# Patient Record
Sex: FEMALE | Race: WHITE | Employment: FULL TIME | URBAN - METROPOLITAN AREA
[De-identification: names, ages, dates, MRNs, and addresses within clinical notes are randomized per-mention and may not be internally consistent; named-entity substitution may affect disease eponyms.]

---

## 2022-10-20 ENCOUNTER — APPOINTMENT (OUTPATIENT)
Dept: GENERAL RADIOLOGY | Age: 52
End: 2022-10-20
Payer: COMMERCIAL

## 2022-10-20 ENCOUNTER — HOSPITAL ENCOUNTER (EMERGENCY)
Age: 52
Discharge: HOME | End: 2022-10-20
Payer: COMMERCIAL

## 2022-10-20 VITALS
BODY MASS INDEX: 29.53 KG/M2 | RESPIRATION RATE: 20 BRPM | SYSTOLIC BLOOD PRESSURE: 144 MMHG | HEIGHT: 72 IN | WEIGHT: 218 LBS | TEMPERATURE: 97.2 F | HEART RATE: 65 BPM | DIASTOLIC BLOOD PRESSURE: 82 MMHG | OXYGEN SATURATION: 99 %

## 2022-10-20 DIAGNOSIS — S46.912A STRAIN OF LEFT SHOULDER, INITIAL ENCOUNTER: Primary | ICD-10-CM

## 2022-10-20 PROCEDURE — 73030 X-RAY EXAM OF SHOULDER: CPT

## 2022-10-20 PROCEDURE — 99283 EMERGENCY DEPT VISIT LOW MDM: CPT

## 2022-10-20 PROCEDURE — 73060 X-RAY EXAM OF HUMERUS: CPT

## 2022-10-20 RX ORDER — NAPROXEN 500 MG/1
500 TABLET ORAL 2 TIMES DAILY
Qty: 60 TABLET | Refills: 0 | Status: SHIPPED | OUTPATIENT
Start: 2022-10-20 | End: 2022-10-27

## 2022-10-20 RX ORDER — CYCLOBENZAPRINE HCL 10 MG
10 TABLET ORAL 3 TIMES DAILY PRN
Qty: 21 TABLET | Refills: 0 | Status: SHIPPED | OUTPATIENT
Start: 2022-10-20 | End: 2022-10-27

## 2022-10-20 ASSESSMENT — PAIN DESCRIPTION - PAIN TYPE: TYPE: ACUTE PAIN

## 2022-10-20 ASSESSMENT — ENCOUNTER SYMPTOMS
SORE THROAT: 0
RHINORRHEA: 0
COLOR CHANGE: 0
CONSTIPATION: 0
ABDOMINAL DISTENTION: 0
EYE DISCHARGE: 0
SHORTNESS OF BREATH: 0
ABDOMINAL PAIN: 0

## 2022-10-20 ASSESSMENT — PAIN DESCRIPTION - DESCRIPTORS: DESCRIPTORS: BURNING

## 2022-10-20 ASSESSMENT — PAIN DESCRIPTION - LOCATION: LOCATION: SHOULDER

## 2022-10-20 ASSESSMENT — PAIN - FUNCTIONAL ASSESSMENT: PAIN_FUNCTIONAL_ASSESSMENT: 0-10

## 2022-10-20 ASSESSMENT — PAIN DESCRIPTION - ORIENTATION: ORIENTATION: LEFT

## 2022-10-20 ASSESSMENT — PAIN DESCRIPTION - FREQUENCY: FREQUENCY: CONTINUOUS

## 2022-10-20 ASSESSMENT — PAIN SCALES - GENERAL: PAINLEVEL_OUTOF10: 8

## 2022-10-20 NOTE — ED PROVIDER NOTES
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 Loreto Ye is a 47 y.o. female who presents to the emergency department complaint of left shoulder, left arm pain, which patient states started yesterday after fall at work. States that she tripped, landing with outstretched arm on a CNC machine at work, she states since that time increasing pain with movement or elevation of left arm across anterior chest wall. She rates her current pain at this time is 8 out of 10. She denies any head neck or back pain. Denies any other injury. No past medical history per patient or chart review. HPI    NursingNotes were reviewed. REVIEW OF SYSTEMS    (2-9 systems for level 4, 10 or more for level 5)     Review of Systems   Constitutional:  Negative for activity change and appetite change. HENT:  Negative for congestion, ear discharge, ear pain, nosebleeds, rhinorrhea and sore throat. Eyes:  Negative for discharge. Respiratory:  Negative for shortness of breath. Cardiovascular:  Negative for chest pain, palpitations and leg swelling. Gastrointestinal:  Negative for abdominal distention, abdominal pain and constipation. Genitourinary:  Negative for difficulty urinating and dysuria. Musculoskeletal:  Negative for arthralgias. Left shoulder and chest wall pain   Skin:  Negative for color change. Neurological:  Negative for dizziness, syncope, numbness and headaches.    Psychiatric/Behavioral:  Negative for agitation and confusion. Except as noted above the remainder of the review of systems was reviewed and negative. PAST MEDICAL HISTORY   History reviewed. No pertinent past medical history. SURGICALHISTORY     History reviewed. No pertinent surgical history. CURRENT MEDICATIONS       Previous Medications    No medications on file       ALLERGIES     Norco [hydrocodone-acetaminophen]    FAMILY HISTORY     History reviewed. No pertinent family history. SOCIAL HISTORY       Social History     Socioeconomic History    Marital status: Legally      Spouse name: None    Number of children: None    Years of education: None    Highest education level: None       SCREENINGS    Sameer Coma Scale  Eye Opening: Spontaneous  Best Verbal Response: Oriented  Best Motor Response: Obeys commands  Sameer Coma Scale Score: 15 @FLOW([id number])@      PHYSICAL EXAM    (up to 7 for level 4, 8 or more for level 5)     ED Triage Vitals [10/20/22 0824]   BP Temp Temp Source Heart Rate Resp SpO2 Height Weight   (!) 144/80 97.1 °F (36.2 °C) Temporal 75 20 100 % 6' (1.829 m) 218 lb (98.9 kg)       Physical Exam  Vitals and nursing note reviewed. Constitutional:       General: She is not in acute distress. Appearance: She is well-developed. She is not ill-appearing, toxic-appearing or diaphoretic. HENT:      Head: Normocephalic. Nose: Nose normal. No congestion. Mouth/Throat:      Mouth: Mucous membranes are moist.      Pharynx: No oropharyngeal exudate or posterior oropharyngeal erythema. Eyes:      Extraocular Movements: Extraocular movements intact. Conjunctiva/sclera: Conjunctivae normal.      Pupils: Pupils are equal, round, and reactive to light. Neck:      Vascular: No JVD. Trachea: No tracheal deviation. Cardiovascular:      Rate and Rhythm: Normal rate. Pulses: Normal pulses. Heart sounds: Normal heart sounds. No murmur heard. No friction rub. No gallop. Pulmonary:      Effort: Pulmonary effort is normal. No tachypnea, accessory muscle usage, respiratory distress or retractions. Breath sounds: Normal breath sounds. No stridor. No wheezing, rhonchi or rales. Comments: Lung sounds are clear in all fields, there is no wheezes rales or rhonchi, no excess muscle use, tractions, room air saturations are 100%  Chest:      Chest wall: No tenderness. Abdominal:      General: Abdomen is flat. Bowel sounds are normal. There is no distension or abdominal bruit. Palpations: There is no shifting dullness, fluid wave, hepatomegaly, splenomegaly, mass or pulsatile mass. Tenderness: There is no abdominal tenderness. There is no right CVA tenderness, left CVA tenderness, guarding or rebound. Negative signs include Bernardo's sign, Rovsing's sign and McBurney's sign. Musculoskeletal:         General: No deformity. Cervical back: Normal range of motion and neck supple. No rigidity. Comments: Patient is pain across anterior left upper chest wall, as well as anterior shoulder, and proximal humerus. There is no crepitus or instability, patient minimally able to raise arm off the bed to approximately 22 degrees. She has abduction, with minimal increase in pain, positive sensation, skin is pink warm and dry, capillary refill within 3 seconds distally, positive radial pulses. Patient moves elbow wrist hand and fingers well without any increase in pain. Skin:     General: Skin is warm and dry. Capillary Refill: Capillary refill takes less than 2 seconds. Coloration: Skin is not jaundiced. Neurological:      General: No focal deficit present. Mental Status: She is alert and oriented to person, place, and time. Mental status is at baseline. Cranial Nerves: No cranial nerve deficit. Sensory: No sensory deficit. Motor: No weakness.       Coordination: Coordination normal.   Psychiatric:         Mood and Affect: Mood normal. DIAGNOSTIC RESULTS     EKG: All EKG's are interpreted by the Emergency Department Physician who either signs or Co-signsthis chart in the absence of a cardiologist.        RADIOLOGY:   Buddie Graft such as CT, Ultrasound and MRI are read by the radiologist. Jonathon Georges radiographic images are visualized and preliminarily interpreted by the emergency physician with the below findings:    X-ray of the left shoulder shows no acute fracture or subluxation. Interpretation per the Radiologist below, if available at the time ofthis note:    XR SHOULDER LEFT (MIN 2 VIEWS)    (Results Pending)   XR HUMERUS LEFT (MIN 2 VIEWS)    (Results Pending)         ED BEDSIDE ULTRASOUND:   Performed by ED Physician - none    LABS:  Labs Reviewed - No data to display    All other labs were within normal range or not returned as of this dictation. EMERGENCY DEPARTMENT COURSE and DIFFERENTIAL DIAGNOSIS/MDM:   Vitals:    Vitals:    10/20/22 0824   BP: (!) 144/80   Pulse: 75   Resp: 20   Temp: 97.1 °F (36.2 °C)   TempSrc: Temporal   SpO2: 100%   Weight: 218 lb (98.9 kg)   Height: 6' (1.829 m)            MDM  Number of Diagnoses or Management Options  Strain of left shoulder, initial encounter  Diagnosis management comments: Patient complains of left shoulder and chest wall pain secondary to injury which occurred yesterday while patient was at work, she states she fell landing on her left arm, straining chest wall, and left shoulder. Patient has increased pain with movement, elevation of left upper extremity. X-ray was obtained of the shoulder which shows no acute bony fractures, there is concerns for rotator cuff injury, versus that of shoulder strain. Patient was given a prescription for Naprosyn Flexeril, and advised to contact occupational medicine for follow-up.   She was placed on work restrictions as I feel that any use of her left upper extremities at this time will cause worsening pain, and potential for further injury. She was advised to contact occupational medicine for follow-up, should she have any worsening or change in condition, she was advised return to the ED. CRITICAL CARE TIME   Total Critical Care time was  minutes, excluding separately reportableprocedures. There was a high probability of clinicallysignificant/life threatening deterioration in the patient's condition which required my urgent intervention. CONSULTS:  None    PROCEDURES:  Unless otherwise noted below, none     Procedures    FINAL IMPRESSION      1.  Strain of left shoulder, initial encounter          DISPOSITION/PLAN   DISPOSITION Decision To Discharge 10/20/2022 09:34:07 AM      PATIENT REFERRED TO:    [facility]  In 1 day      DISCHARGE MEDICATIONS:  New Prescriptions    CYCLOBENZAPRINE (FLEXERIL) 10 MG TABLET    Take 1 tablet by mouth 3 times daily as needed for Muscle spasms    NAPROXEN (NAPROSYN) 500 MG TABLET    Take 1 tablet by mouth 2 times daily for 7 days          (Please note that portions of this note were completed with a voice recognition program.  Efforts were made to edit the dictations but occasionally words are mis-transcribed.)    Lencho Ayala PA-C (electronically signed)  Attending Emergency Physician         Lenhco Ayala PA-C  10/20/22 5518

## 2022-10-20 NOTE — ED TRIAGE NOTES
Pt presents to ED with c/o L shoulder pain. 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 Since, pt reports \"burning\" to L shoulder, limited ROM, and swelling to LUE. Sensations intact. Upon assessment, pt is A/Ox4, skin p/w/d, respirations even and unlabored, msp's intact. Pt denies chest pain, SOB, n/v/d, fever, and chills. Denies hitting head during incident. L radial pulse palp, cap refill < 2 seconds.

## 2022-11-08 ENCOUNTER — HOSPITAL ENCOUNTER (OUTPATIENT)
Dept: MRI IMAGING | Age: 52
Discharge: HOME | End: 2022-11-10

## 2022-11-08 DIAGNOSIS — S46.912A STRAIN OF ELBOW, LEFT, INITIAL ENCOUNTER: ICD-10-CM

## 2022-11-08 PROCEDURE — 73221 MRI JOINT UPR EXTREM W/O DYE: CPT

## 2022-12-02 ENCOUNTER — HOSPITAL ENCOUNTER (OUTPATIENT)
Dept: PHYSICAL THERAPY | Age: 52
Setting detail: THERAPIES SERIES
Discharge: HOME | End: 2022-12-02
Payer: COMMERCIAL

## 2022-12-02 PROCEDURE — 97161 PT EVAL LOW COMPLEX 20 MIN: CPT

## 2022-12-02 PROCEDURE — 97110 THERAPEUTIC EXERCISES: CPT

## 2022-12-02 ASSESSMENT — PAIN DESCRIPTION - FREQUENCY: FREQUENCY: INTERMITTENT

## 2022-12-02 ASSESSMENT — PAIN DESCRIPTION - ONSET: ONSET: SUDDEN

## 2022-12-02 ASSESSMENT — PAIN - FUNCTIONAL ASSESSMENT: PAIN_FUNCTIONAL_ASSESSMENT: PREVENTS OR INTERFERES WITH ALL ACTIVE AND SOME PASSIVE ACTIVITIES

## 2022-12-02 ASSESSMENT — PAIN DESCRIPTION - PAIN TYPE: TYPE: CHRONIC PAIN

## 2022-12-02 ASSESSMENT — PAIN DESCRIPTION - ORIENTATION: ORIENTATION: LEFT

## 2022-12-02 ASSESSMENT — PAIN SCALES - GENERAL: PAINLEVEL_OUTOF10: 0

## 2022-12-02 ASSESSMENT — PAIN DESCRIPTION - DESCRIPTORS: DESCRIPTORS: SHARP;STABBING

## 2022-12-02 ASSESSMENT — PAIN DESCRIPTION - LOCATION: LOCATION: SHOULDER

## 2022-12-02 NOTE — PROGRESS NOTES
[address]  PHYSICAL THERAPY EVALUATION      Physical Therapy: Initial Evaluation    Patient: Elissa Patricia (43 y.o.     female)   Examination Date: 2022   :  1970 ;    Confirmed: Yes MRN: [id number]  CSN: [id number]   Insurance: Payor: [employer] / Plan: [employer] / Product Type: *No Product type* /   Insurance ID: [id number] - (Worker's Comp) Secondary Insurance (if applicable):    Referring Physician: Teo Nugent MD       Visits to Date/Visits Approved:     No Show/Cancelled Appts: 0 / 0     Medical Diagnosis: Strain of unspecified muscle, fascia and tendon at shoulder and upper arm level, left arm, initial encounter [S46.912A]  Strain of muscle, fascia and tendon at neck level, initial encounter [S16. 1XXA]        Treatment Diagnosis: L shoulder and neck pain,decreased L shoulder AROM, decreased cervical AROM, decreased thoracic rotation AROM, decreased L UE strength, decreased posture, decreased activity tolerance, and signifciant soft tissue reestrictions throughout cervical and shoulder complex     PERTINENT MEDICAL HISTORY   Patient Assessed for Rehabilitation Services: Yes       Medical History: Chart Reviewed: Yes No past medical history on file. Surgical History: No past surgical history on file. Medications:   Current Outpatient Medications:     naproxen (NAPROSYN) 500 MG tablet, Take 1 tablet by mouth 2 times daily for 7 days, Disp: 60 tablet, Rfl: 0  Allergies: Norco [hydrocodone-acetaminophen]      Imaging (if applicable): MRI SHOULDER LEFT WO CONTRAST    Result Date: 2022  EXAMINATION: MRI OF THE LEFT SHOULDER WITHOUT CONTRAST   2022 2:01 pm TECHNIQUE: Multiplanar multisequence MRI of the left shoulder was performed without the administration of intravenous contrast. COMPARISON: Radiographs 10/20/2022.  HISTORY: ORDERING SYSTEM PROVIDED HISTORY: Strain of elbow, left, initial encounter TECHNOLOGIST PROVIDED HISTORY: What reading provider will be dictating this exam?->CRC Fall at work with left shoulder pain. FINDINGS: ROTATOR CUFF: Moderate tendinosis involving supraspinatus with mostly low-grade partial-thickness articular sided tearing of the distal fibers measuring around 2.0 cm AP. Mild tendinosis involving infraspinatus and subscapularis, without tear. Teres minor appears intact. Reactive cystic changes at the infraspinatus insertions. BICEPS TENDON: Appears intact with mild intra-articular tendinosis. LABRUM: Areas of fraying/tearing especially superiorly GLENOHUMERAL JOINT: No evidence for full-thickness chondral defect. Small joint effusion. AC JOINT: Mild degenerative changes. Small amount of fluid in the subacromial subdeltoid bursa region. BONE MARROW: No evidence of fracture. Normal marrow signal.     Rotator cuff tendinosis with mostly low-grade partial-thickness articular sided tearing involving distal supraspinatus, without full-thickness tear. 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 Used a stool to reach inside of machine and stool came out from under her and she landed on L shoulder. Went to the ER the following day d/t pain. Saw occupational health who gave her an anti-inflammatory, injection, and mm relaxer. Pt denies having any N/T into UE currently though does occurr intermittently from posterior UE from elbow to 5th digit.   Additional Pertinent Hx (if applicable):     Prior diagnostic testing[de-identified] MRI, X-ray  Previous treatments prior to current episode?: Injections, Medications      Learning/Language: Learning  Does the patient/guardian have any barriers to learning?: No barriers  Will there be a co-learner?: No  What is the preferred language of the patient/guardian?: English  Is an  required?: No     Pain Screening    Pain Screening  Patient Currently in Pain: Yes  Pain Assessment: 0-10  Pain Level: 0  Best Pain Level: 0  Worst Pain Level: 10  Pain Type: Chronic pain  Pain Location: Shoulder  Pain Orientation: Left  Pain Descriptors: Sharp, Stabbing  Pain Frequency: Intermittent  Pain Onset: Sudden  Functional Pain Assessment: Prevents or interferes with all active and some passive activities  Aggravating factors: Movement, Reaching, Lifting, Carrying  Pain Management/Relieving Factors: Rest, Medications    Functional Status      Occupation/Interests:   Occupation: Full time employment  Type of Occupation: [employer]- Currently working with restrictions- 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, taking parts in and out, 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#  Job Duties: Prolonged standing, Heavy lifting, Moderate lifting, Repetitive or prolonged grasping  Leisure & Hobbies: playing darts    Prior Level of Function:   100% Independent      Current Level of Function:   25%      ADL Assistance: Independent  Homemaking Assistance: Independent  Homemaking Responsibilities: Yes  Ambulation Assistance: Independent  Transfer Assistance: Independent  Active : Yes    Dominant Hand: : Right    OBJECTIVE EXAMINATION     Review of Systems:  Follows Commands: Within Functional Limits    Observations:   General Observations  Description: or pose with rounded shoudlers, FHP, increased thoracic kyphosis, protracted scaps    Palpation:   Right Shoulder Palpation: mod tightness UT, periscapulars, LS  Left Shoulder Palpation: significant tightness with multiple TP's noted throughout UT, periscapulars, and LS; mod tightness pec, bicep, deltoid;  Tender to palpate RTC insertion    Left AROM  Right AROM         AROM LUE (degrees)  L Shoulder Flexion (0-180): 70 with pain  L Shoulder Extension (0-45): 20 with pain  L Shoulder ABduction (0-180): 62 with pain  L Shoulder Int Rotation  (0-70): Greater trochanter with pain, unable to reach midline  L Shoulder Ext Rotation  (0-90): T1    AROM RUE (degrees)  R Shoulder Flexion (0-180): 170  R Shoulder Extension (0-45): 70  R Shoulder ABduction (0-180): 168  R Shoulder Int Rotation  (0-70): T12  R Shoulder Ext Rotation (0-90): T3      Left PROM  Right PROM         PROM LUE (degrees)  L Shoulder Flex  (0-180): limited d/t pain  L Shoulder ABduction (0-180): limited d/t pain  L Shoulder Int Rotation  (0-70): limited d/t pain  L Shoulder Ext Rotation  (0-90): limited d/t pain NT         Left Strength  Right Strength        L UE Strength Comment: shoulder 2-/5, elbow flex 4/5 ext 4-/5  L Lower Trapezius: 4/5      R UE Strength Comment: 5/5 except shoulder abd 4+/5  R Middle Trapezius: 4/5  R Rhomboids: 4/5  R Lower Trapezius: 4-/5  R UE Strength Comment: 5/5 except shoulder abd 4+/5     Cervical Assessment     AROM Cervical Spine   Cervical spine general AROM: flex 65, ext 30, SB L 25 with pain R 35, Rot L 40 with pain R 73     Thoracic Assessment     AROM Thoracic Spine   Thoracic spine general AROM: B rotation 50%        Special Tests:   Special Tests: N/A (limited shoulder mobility with guarding, review recent MRI)    Outcomes Score:  Exam: UEFI 59/ 80, NDI 9/50    Treatment:    Exercises:   Exercises  Exercise 1: L UT stretch without over pressure 20\"x3  Exercise 2: L LS stretch without over pressure 20\"x3  Exercise 3: table slides flex, scaption 5\"x10  Exercise 4: pulleys*  Exercise 5: scap retract*  Exercise 6: wand ex*  Exercise 7: thoracic rotation*  Exercise 8: chin nods*  Exercise 9: R cervical rotation*  Exercise 10: finger ladder*  Exercise 14: PROM: L shoulder*  Exercise 20: HEP: LS, UT stretch, table slides  Treatment Reasoning  Limitations addressed:  Mobility, Flexibility    Modalities:MHP/CP/US/Estim/cervical traction*    Manual:  Manual Therapy  Soft Tissue Mobilizaton: L UT, LS, periscapulars, bicep, deltoid*     *Indicates exercise,modality, or manual techniques to be initiated when appropriate  ASSESSMENT     Impression: Assessment: Pt presents with onset of L shoulder and neck pain beginning after a work related injury where she slipped off a stool reaching into a machine and landed on her L UE 10/19/22. She currently presents with decreased L shoulder AROM, decreased cervical AROM, decreased thoracic rotation AROM, decreased L UE strength, decreased posture, decreased activity tolerance, and signifciant soft tissue reestrictions throughout cervical and shoulder complex. These impairments currently limit her fucntional abilities to reach, lift, carry, perform ADL's, household duties, work duties, push, pull, or sleep without limitations at PLOF. Body Structures, Functions, Activity Limitations Requiring Skilled Therapeutic Intervention: Decreased posture, Increased pain, Decreased ADL status, Decreased ROM, Decreased strength, Decreased tolerance to work activity    Statement of Medical Necessity: Physical Therapy is both indicated and medically necessary as outlined in the POC to increase the likelihood of meeting the functionally related goals stated below.      Patient's Activity Tolerance: Patient tolerated evaluation without incident Increased pain noted post exertion, declined modalities    Patient's rehabilitation potential/prognosis is considered to be: Good    Factors which may impact rehabilitation potential include: Chronicity of problem, Profession/work barriers     Patient Education: PT Education: Goals, PT Role, Plan of Care, Evaluative findings, Insurance, Home Exercise Program     GOALS   Patient Goal(s): Patient Goals : \"Use my shoulder\"    Short Term Goals Completed by 2-3 weeks Goal Status   STG 1The pt will demonstrate improved postural awareness requiring <25% VC's with exercises New   STG 2 The pt will have decreased L UE pain </= 3/10 at worst in order to increase ease with ADL's New     Long Term Goals Completed by 4-6 weeks Goal Status   LTG 1 The pt will be indep/compliant with HEP in order to self-manage symptoms upon D/C New   LTG 2 The pt will have an increase in UEFI score >/=70/80 and decrease in NDI score to 0/50 points in order to increase functional activity tolerance New   LTG 3 The pt will demonstrate improved L UE strength >/=4+/5 in order to lift/carry up to 50# for work related duties at [address] [place] [address]   LT 4 The pt will demo improved L shoulder and cervical AROM WNL's and B thoracic rotation >/=75% in order to increase ease with UE ADL's New     TREATMENT PLAN       Requires PT Follow-Up: Yes  Treatment Initiated : ther ex and HEP    Treatment may include any combination of the following: Strengthening, ROM, Neuromuscular re-education, Manual, Pain management, Return to work related activity, Home exercise program, Patient/Caregiver education & training, Modalities, Positioning, Dry needling     Frequency / Duration:  Patient to be seen 2 times per week for 4-6 weeks        Eval Complexity:   Decision Making: Medium Complexity  History: Personal Factors and/or Comorbidities Impacting POC: Medium  Examination of body system(s) including body structures and functions, activity limitations, and/or participation restrictions: High  Exam: UEFI 59/ 80, NDI 9/50  Clinical Presentation: Medium  Clinical Decision Making : Medium Complexity    POST-PAIN     Pain Rating (0-10 pain scale):   5/10  Location and pain description same as pre-treatment unless indicated. Action: [] NA  [] Call Physician  [x] Perform HEP  [] Meds as prescribed    Evaluation and patient rights have been reviewed and patient agrees with plan of care.   Yes  [x]  No  []   Explain:     Tamia Fall Risk Assessment  Risk Factor Scale  Score   History of Falls [] Yes  [x] No 25  0    Secondary Diagnosis [] Yes  [x] No 15  0    Ambulatory Aid [] Furniture  [] Crutches/cane/walker  [x] None/bedrest/wheelchair/nurse 30  15  0    IV/Heparin Lock [] Yes  [x] No 20  0    Gait/Transferring [] Impaired  [] Weak  [x] Normal/bedrest/immobile 20  10  0    Mental Status [] Forgets limitations  [x] Oriented to own ability 15  0       Total: 0     Based on the Assessment score: check the appropriate box.   [x]  No intervention needed   Low =   Score of 0-24  []  Use standard prevention interventions Moderate =  Score of 24-44   [] Discuss fall prevention strategies   [] Indicate moderate falls risk on eval  []  Use high risk prevention interventions High = Score of 45 and higher   [] Discuss fall prevention strategies   [] Provide supervision during treatment time    Minutes:  PT Individual Minutes  Time In: 1512  Time Out: C/ [place]arias 66  Minutes: 36  Timed Code Treatment Minutes: 12 Minutes  Procedure Minutes: 24 eval      Time In Time Out Total Time Units    PT Evaluation: Moderate Complexity ([id number]) 1719 4865 24 1   Ther ex ([id number]) 2221 8213 12 1     Electronically signed by Neeraj Velasquez, PT on 12/2/22 at 3:55 PM EST

## 2022-12-07 ENCOUNTER — HOSPITAL ENCOUNTER (OUTPATIENT)
Dept: PHYSICAL THERAPY | Age: 52
Setting detail: THERAPIES SERIES
Discharge: HOME | End: 2022-12-07

## 2022-12-07 NOTE — PROGRESS NOTES
Therapy                            Cancellation/No-show Note      Date: 2022  Patient: Hortensia Everett (75 y.o. female)  : 1970  MRN:  [id number]  Referring Physician: No ref. provider found    Medical Diagnosis: No admission diagnoses are documented for this encounter. Visit Information:  Visits to Date 1   No Show/Cancelled Appts: 0       For today's appointment patient:  [x]  Cancelled  []  Rescheduled appointment  []  No-show   []  Called pt to remind of next appointment     Reason given by patient:  [x]  Patient ill  []  Conflicting appointment  []  No transportation    []  Conflict with work  []  No reason given  []  Other:      [x] Pt has future appointments scheduled, no follow up needed  [] Pt requests to be on hold.     Reason:   If > 2 weeks please discuss with therapist.  [] Therapist to call pt for follow up     Comments:       Signature: Electronically signed by Georgiana Celaya PTA on 22 at 7:54 AM EST

## 2022-12-09 ENCOUNTER — HOSPITAL ENCOUNTER (OUTPATIENT)
Dept: PHYSICAL THERAPY | Age: 52
Setting detail: THERAPIES SERIES
Discharge: HOME | End: 2022-12-09
Payer: COMMERCIAL

## 2022-12-09 NOTE — PROGRESS NOTES
Therapy                            Cancellation/No-show Note      Date: 2022  Patient: Yong Dancer (37 y.o. female)  : 1970  MRN:  [id number]  Referring Physician: Ginny Mckenzie MD    Medical Diagnosis: Strain of unspecified muscle, fascia and tendon at shoulder and upper arm level, left arm, initial encounter [S46.912A]  Strain of muscle, fascia and tendon at neck level, initial encounter [S16. 1XXA]      Visit Information:  Visits to Date 1   No Show/Cancelled Appts: 0 /       For today's appointment patient:  [x]  Cancelled  []  Rescheduled appointment  []  No-show   []  Called pt to remind of next appointment     Reason given by patient:  [x]  Patient ill  []  Conflicting appointment  []  No transportation    []  Conflict with work  []  No reason given  []  Other:      [x] Pt has future appointments scheduled, no follow up needed  [] Pt requests to be on hold.     Reason:   If > 2 weeks please discuss with therapist.  [] Therapist to call pt for follow up     Comments:       Signature: Electronically signed by Ramírez Esterlla PTA on 22 at 8:09 AM EST

## 2022-12-13 ENCOUNTER — HOSPITAL ENCOUNTER (OUTPATIENT)
Dept: PHYSICAL THERAPY | Age: 52
Setting detail: THERAPIES SERIES
Discharge: HOME | End: 2022-12-13
Payer: COMMERCIAL

## 2022-12-13 PROCEDURE — 97110 THERAPEUTIC EXERCISES: CPT

## 2022-12-13 NOTE — PROGRESS NOTES
[address] [facility]  Outpatient Physical Therapy    Treatment Note        Date: 2022  Patient: Oleksandr Aguillon  : 1970   Confirmed: Yes  MRN: [id number]  Referring Provider: Jose Angel Engle MD    Medical Diagnosis: Strain of unspecified muscle, fascia and tendon at shoulder and upper arm level, left arm, initial encounter [S46.912A]  Strain of muscle, fascia and tendon at neck level, initial encounter [S16. 1XXA]       Treatment Diagnosis: L shoulder and neck pain,decreased L shoulder AROM, decreased cervical AROM, decreased thoracic rotation AROM, decreased L UE strength, decreased posture, decreased activity tolerance, and signifciant soft tissue reestrictions throughout cervical and shoulder complex    Visit Information:  Insurance: Payor: [employer] / Plan: [employer] / Product Type: *No Product type* /   PT Visit Information  Onset Date: 10/19/22  PT Insurance Information: [facility] claim # [id number]  Total # of Visits Approved: 12  Total # of Visits to Date: 2  Plan of Care/Certification Expiration Date: 22  No Show: 0  Progress Note Due Date: 22  Canceled Appointment: 2  Progress Note Counter:     Subjective Information:  Subjective: Pt reports  had increased pain with throwing her blanket off of her. Has been using it more at work which seems to be working ok and is using her body more for work related duties vs just her arm. Has been compliant with HEP.   HEP Compliance:  [x] Good [] Fair [] Poor [] Reports not doing due to:    Pain Screening  Patient Currently in Pain: Denies    Treatment:  Exercises:  Exercises  Exercise 1: L UT stretch without over pressure 20\"x3  Exercise 2: L LS stretch without over pressure 20\"x3  Exercise 3: table slides flex, scaption 5\"x10  Exercise 4: pulleys x4 mins  Exercise 5: scap retract 5\"x10  Exercise 6: wand ex*  Exercise 7: thoracic rotation 5\"x10  Exercise 8: chin nods seated 5\"x10  Exercise 9: B cervical rotation 5\"x10  Exercise 10: finger ladder flex/scaption 5\"x5  Exercise 14: PROM: L shoulder*  Exercise 20: HEP:  cont current  Treatment Reasoning  Limitations addressed: Mobility, Flexibility    *Indicates exercise, modality, or manual techniques to be initiated when appropriate    Objective Measures:     LTG 4 Current Status[de-identified] 12/13/22: L shoudler AROM flex 125, abd 88, Ext 45, ER T3, IR posterior lip of iliac crest; cervcial AROM flex 60, ext 47, SB L 40, R 40, Rot L 62 R 73    Assessment: Body Structures, Functions, Activity Limitations Requiring Skilled Therapeutic Intervention: Decreased posture, Increased pain, Decreased ADL status, Decreased ROM, Decreased strength, Decreased tolerance to work activity  Assessment: Initiated multiple exercises for increased L shoulder and cervical ROM as well as postural awareness. Pt with good tolerance to all though challenged with scaption on finger ladder. Pt demos significnat improvement in cervical and shoulder AROM this date though conts to demo limitations in end ranges especially shoudler flex, abd, and IR as well as L cervical rotation and B SB. Treatment Diagnosis: L shoulder and neck pain,decreased L shoulder AROM, decreased cervical AROM, decreased thoracic rotation AROM, decreased L UE strength, decreased posture, decreased activity tolerance, and signifciant soft tissue reestrictions throughout cervical and shoulder complex  Therapy Prognosis: Good     Patient Education: [x] NA     Post-Pain Assessment:       Pain Rating (0-10 pain scale):  0 /10   Location and pain description same as pre-treatment unless indicated.    Action: [] NA   [x] Perform HEP  [] Meds as prescribed  [] Modalities as prescribed   [] Call Physician     GOALS   Patient Goal(s): Patient Goals : \"Use my shoulder\"    Short Term Goals Completed by 2-3 weeks Goal Status   STG 1 The pt will demonstrate improved postural awareness requiring <25% VC's with exercises In progress   STG 2 The pt will have decreased L UE pain </= 3/10 at worst in order to increase ease with ADL's In progress     Long Term Goals Completed by 4-6 weeks Goal Status   LTG 1 The pt will be indep/compliant with HEP in order to self-manage symptoms upon D/C In progress   LTG 2 The pt will have an increase in UEFI score >/=70/80 and decrease in NDI score to 0/50 points in order to increase functional activity tolerance In progress   LTG 3 The pt will demonstrate improved L UE strength >/=4+/5 in order to lift/carry up to 50# for work related duties at [employer] In progress   LTG 4 The pt will demo improved L shoulder and cervical AROM WNL's and B thoracic rotation >/=75% in order to increase ease with UE ADL's In progress, Partially met     Plan:  Frequency/Duration:  Plan  Plan Frequency: 2  Plan weeks: 4-6  Current Treatment Recommendations: Strengthening, ROM, Neuromuscular re-education, Manual, Pain management, Return to work related activity, Home exercise program, Patient/Caregiver education & training, Modalities, Positioning, Dry needling  Modalities: Heat/Cold, Mechanical Traction, Ultrasound, E-stim - unattended, E-stim - manual  Pt to continue current HEP. See objective section for any therapeutic exercise changes, additions or modifications this date.     Therapy Time:   PT Individual Minutes  Time In: 0802  Time Out: 4606  Minutes: 39  Timed Code Treatment Minutes: 39 Minutes  Procedure Minutes: 0    Modality Time In Time Out Total Minutes Units    Ther ex ([id number]) [id number] [phone]     Electronically signed by Emma Becker PT on 12/13/22 at 8:28 AM EST

## 2022-12-15 ENCOUNTER — HOSPITAL ENCOUNTER (OUTPATIENT)
Dept: PHYSICAL THERAPY | Age: 52
Setting detail: THERAPIES SERIES
Discharge: HOME | End: 2022-12-15
Payer: COMMERCIAL

## 2022-12-15 PROCEDURE — 97110 THERAPEUTIC EXERCISES: CPT

## 2022-12-15 PROCEDURE — 97140 MANUAL THERAPY 1/> REGIONS: CPT

## 2022-12-15 ASSESSMENT — PAIN DESCRIPTION - LOCATION: LOCATION: SHOULDER;ARM

## 2022-12-15 ASSESSMENT — PAIN SCALES - GENERAL: PAINLEVEL_OUTOF10: 3

## 2022-12-15 ASSESSMENT — PAIN DESCRIPTION - PAIN TYPE: TYPE: CHRONIC PAIN

## 2022-12-15 ASSESSMENT — PAIN DESCRIPTION - DIRECTION: RADIATING_TOWARDS: LEFT BICEP

## 2022-12-15 ASSESSMENT — PAIN DESCRIPTION - ORIENTATION: ORIENTATION: LEFT

## 2022-12-15 ASSESSMENT — PAIN DESCRIPTION - DESCRIPTORS: DESCRIPTORS: ACHING;SHARP;STABBING

## 2022-12-15 NOTE — PROGRESS NOTES
[address] [facility]  Outpatient Physical Therapy    Treatment Note        Date: 12/15/2022  Patient: Noel Joe  : 1970   Confirmed: Yes  MRN: [id number]  Referring Provider: Mike Meza MD    Medical Diagnosis: Strain of unspecified muscle, fascia and tendon at shoulder and upper arm level, left arm, initial encounter [S46.912A]  Strain of muscle, fascia and tendon at neck level, initial encounter [S16. 1XXA]       Treatment Diagnosis: L shoulder and neck pain,decreased L shoulder AROM, decreased cervical AROM, decreased thoracic rotation AROM, decreased L UE strength, decreased posture, decreased activity tolerance, and signifciant soft tissue reestrictions throughout cervical and shoulder complex    Visit Information:  Insurance: Payor: [employer] / Plan: [employer] / Product Type: *No Product type* /   PT Visit Information  Onset Date: 10/19/22  PT Insurance Information: [facility] claim # [id number]  Total # of Visits Approved: 12  Total # of Visits to Date: 3  Plan of Care/Certification Expiration Date: 22  No Show: 0  Progress Note Due Date: 22  Canceled Appointment: 2  Progress Note Counter: 3/12    Subjective Information:  Subjective: Pt reports feeling good when she left therapy on Tuesday but woke up the next day in extreme pain and was unable to go to work. I don't know if I over did it, or what caused it. T/N comes and goes, located in Left elbow.  Pain currently 3/10 at rest, incrased pain with movements 7/10  HEP Compliance:  [x] Good [] Fair [] Poor [] Reports not doing due to:    Pain Screening  Patient Currently in Pain: Yes  Pain Assessment: 0-10  Pain Level: 3  Pain Type: Chronic pain  Pain Location: Shoulder, Arm  Pain Orientation: Left  Pain Radiating Towards: Left bicep  Pain Descriptors: Aching, Sharp, Stabbing    Treatment:  Exercises:  Exercises  Exercise 1: L UT stretch without over pressure 20\"x3  Exercise 2: L LS stretch without over pressure 20\"x3  Exercise 3: table slides flex, scaption 5\"x10  Exercise 4: pulleys x4 mins  Exercise 5: scap retract 5\"x10  Exercise 6: wand ex: supine flexion to 90  Exercise 7: thoracic rotation 5\"x10  Exercise 8: chin nods seated 5\"x10  Exercise 9: B cervical rotation 5\"x10  Exercise 10: finger ladder flex/scaption 5\"x5  Exercise 14: PROM: L shoulder flexion / scaption. Exercise 20: HEP:  cont current  Treatment Reasoning  Limitations addressed: Mobility, Flexibility    Manual:   Manual Therapy  Joint Mobilization: Left scap mobs retraction and depression with limited ROM into depression due to tissue resistance. Soft Tissue Mobilizaton: L UT, periscapulars  Other: Total manual time: 12 min. Modalities:          *Indicates exercise, modality, or manual techniques to be initiated when appropriate    Objective Measures:            Assessment: Body Structures, Functions, Activity Limitations Requiring Skilled Therapeutic Intervention: Decreased posture, Increased pain, Decreased ADL status, Decreased ROM, Decreased strength, Decreased tolerance to work activity  Assessment: Continued with current program with focus on shoulder mobility. Pt with increased pain with scaption motion with poor tolerance due to elevating pain levels. Provided manual therapy with focus on posterior shoulder complex in order to reduce tension and improve mobility with reduction of pain reported post tx session. Pt's current tolerance to PROM is ~ 90 deg flexion and ABD before becoming limited by pain levels and muscle guarding.   Treatment Diagnosis: L shoulder and neck pain,decreased L shoulder AROM, decreased cervical AROM, decreased thoracic rotation AROM, decreased L UE strength, decreased posture, decreased activity tolerance, and signifciant soft tissue reestrictions throughout cervical and shoulder complex  Therapy Prognosis: Good       Patient Education: [x] NA       Post-Pain Assessment:       Pain Rating (0-10 pain scale):   0/10   Location and pain description same as pre-treatment unless indicated. Action: [] NA   [x] Perform HEP  [] Meds as prescribed  [] Modalities as prescribed   [] Call Physician     GOALS   Patient Goal(s): Patient Goals : \"Use my shoulder\"    Short Term Goals Completed by 2-3 weeks Goal Status   STG 1 The pt will demonstrate improved postural awareness requiring <25% VC's with exercises In progress   STG 2 The pt will have decreased L UE pain </= 3/10 at worst in order to increase ease with ADL's In progress     Long Term Goals Completed by 4-6 weeks Goal Status   LTG 1 The pt will be indep/compliant with HEP in order to self-manage symptoms upon D/C In progress   LTG 2 The pt will have an increase in UEFI score >/=70/80 and decrease in NDI score to 0/50 points in order to increase functional activity tolerance In progress   LTG 3 The pt will demonstrate improved L UE strength >/=4+/5 in order to lift/carry up to 50# for work related duties at [employer] In progress   LTG 4 The pt will demo improved L shoulder and cervical AROM WNL's and B thoracic rotation >/=75% in order to increase ease with UE ADL's In progress, Partially met     Plan:  Frequency/Duration:  Plan  Plan Frequency: 2  Plan weeks: 4-6  Current Treatment Recommendations: Strengthening, ROM, Neuromuscular re-education, Manual, Pain management, Return to work related activity, Home exercise program, Patient/Caregiver education & training, Modalities, Positioning, Dry needling  Modalities: Heat/Cold, Mechanical Traction, Ultrasound, E-stim - unattended, E-stim - manual  Pt to continue current HEP. See objective section for any therapeutic exercise changes, additions or modifications this date.     Therapy Time:      PT Individual Minutes  Time In: 7355  Time Out: 1558  Minutes: 41  Timed Code Treatment Minutes: 41 Minutes  Procedure Minutes:0    Modality Time In Time Out Total Minutes Units    Ther ex ([id number]) [phone]   Manual Therapy ([phone]     Electronically signed by Brandi Myles, DAVID on 12/15/22 at 4:11 PM EST

## 2022-12-19 ENCOUNTER — HOSPITAL ENCOUNTER (OUTPATIENT)
Dept: PHYSICAL THERAPY | Age: 52
Setting detail: THERAPIES SERIES
Discharge: HOME | End: 2022-12-19
Payer: COMMERCIAL

## 2022-12-19 PROCEDURE — 97110 THERAPEUTIC EXERCISES: CPT

## 2022-12-19 ASSESSMENT — PAIN SCALES - GENERAL: PAINLEVEL_OUTOF10: 1

## 2022-12-19 ASSESSMENT — PAIN DESCRIPTION - ORIENTATION: ORIENTATION: LEFT

## 2022-12-19 ASSESSMENT — PAIN DESCRIPTION - DESCRIPTORS: DESCRIPTORS: ACHING

## 2022-12-19 ASSESSMENT — PAIN DESCRIPTION - LOCATION: LOCATION: SHOULDER;ARM

## 2022-12-19 ASSESSMENT — PAIN DESCRIPTION - PAIN TYPE: TYPE: CHRONIC PAIN

## 2022-12-19 NOTE — PROGRESS NOTES
[address] [facility]  Outpatient Physical Therapy    Treatment Note        Date: 2022  Patient: Jessica Mcbride  : 1970   Confirmed: Yes  MRN: [id number]  Referring Provider: Federico Coburn MD    Medical Diagnosis: Strain of unspecified muscle, fascia and tendon at shoulder and upper arm level, left arm, initial encounter [S46.912A]  Strain of muscle, fascia and tendon at neck level, initial encounter [S16. 1XXA]       Treatment Diagnosis: L shoulder and neck pain,decreased L shoulder AROM, decreased cervical AROM, decreased thoracic rotation AROM, decreased L UE strength, decreased posture, decreased activity tolerance, and signifciant soft tissue reestrictions throughout cervical and shoulder complex    Visit Information:  Insurance: Payor: [employer] / Plan: [employer] / Product Type: *No Product type* /   PT Visit Information  Onset Date: 10/19/22  PT Insurance Information: [facility] claim # [id number]  Total # of Visits Approved: 12  Total # of Visits to Date: 4  Plan of Care/Certification Expiration Date: 22  No Show: 0  Progress Note Due Date: 22  Canceled Appointment: 2  Progress Note Counter:     Subjective Information:  Subjective: Pt reports \"it's doing pretty good. \" Pt reports she \"felt great\" following last visit w/ good compliance w/ HEP and is using her heating pad more and ordered the tens unit, but it won't be in for another 10 days. Pt reports she hasn't had N/Ting for about a week now.   HEP Compliance:  [x] Good [] Fair [] Poor [] Reports not doing due to:    Pain Screening  Patient Currently in Pain: Yes  Pain Assessment: 0-10  Pain Level: 1  Pain Type: Chronic pain  Pain Location: Shoulder, Arm  Pain Orientation: Left  Pain Descriptors: Aching    Treatment:  Exercises:  Exercises  Exercise 1: L UT stretch without over pressure 20\"x3  Exercise 2: L LS stretch without over pressure 20\"x3  Exercise 3: table slides flex, scaption 5\"x10  Exercise 4: pulleys x4 mins  Exercise 5: scap retract 5\"x10  Exercise 6: wand ex: supine chest press x10  Exercise 7: thoracic rotation 5\"x10  Exercise 8: chin nods seated 5\"x10  Exercise 9: B cervical rotation 5\"x10  Exercise 10: finger ladder flex/scaption 5\"x5  Exercise 11: standing wand: ext, IR, ER, scaption*  Exercise 14: PROM: L shoulder flexion / scaption. Exercise 20: HEP:  cont current  Treatment Reasoning  Limitations addressed: Mobility, Flexibility    *Indicates exercise, modality, or manual techniques to be initiated when appropriate    Objective Measures:     LTG 4 Current Status[de-identified] L shoulder flex 130, abd 88, ER T3, IR posterior lip of iliac crest      Assessment: Body Structures, Functions, Activity Limitations Requiring Skilled Therapeutic Intervention: Decreased posture, Increased pain, Decreased ADL status, Decreased ROM, Decreased strength, Decreased tolerance to work activity  Assessment: Pt w/ improved ability to relax during PROM today, however cont's to be limited by pain at end ranges. Pt demo's increased L shoulder flex AROM; however no significant change w/ abd and IR measures. Treatment Diagnosis: L shoulder and neck pain,decreased L shoulder AROM, decreased cervical AROM, decreased thoracic rotation AROM, decreased L UE strength, decreased posture, decreased activity tolerance, and signifciant soft tissue reestrictions throughout cervical and shoulder complex  Therapy Prognosis: Good       Patient Education: [x] NA       Post-Pain Assessment:       Pain Rating (0-10 pain scale):   1/10   Location and pain description same as pre-treatment unless indicated.    Action: [] NA   [x] Perform HEP  [] Meds as prescribed  [] Modalities as prescribed   [] Call Physician     GOALS   Patient Goal(s): Patient Goals : \"Use my shoulder\"    Short Term Goals Completed by 2-3 weeks Goal Status   STG 1 The pt will demonstrate improved postural awareness requiring <25% VC's with exercises In progress   STG 2 The pt will have decreased L UE pain </= 3/10 at worst in order to increase ease with ADL's In progress     Long Term Goals Completed by 4-6 weeks Goal Status   LTG 1 The pt will be indep/compliant with HEP in order to self-manage symptoms upon D/C In progress   LTG 2 The pt will have an increase in UEFI score >/=70/80 and decrease in NDI score to 0/50 points in order to increase functional activity tolerance In progress   LTG 3 The pt will demonstrate improved L UE strength >/=4+/5 in order to lift/carry up to 50# for work related duties at [employer] In progress   LTG 4 The pt will demo improved L shoulder and cervical AROM WNL's and B thoracic rotation >/=75% in order to increase ease with UE ADL's In progress, Partially met          Plan:  Frequency/Duration:  Plan  Plan Frequency: 2  Plan weeks: 4-6  Current Treatment Recommendations: Strengthening, ROM, Neuromuscular re-education, Manual, Pain management, Return to work related activity, Home exercise program, Patient/Caregiver education & training, Modalities, Positioning, Dry needling  Modalities: Heat/Cold, Mechanical Traction, Ultrasound, E-stim - unattended, E-stim - manual  Pt to continue current HEP. See objective section for any therapeutic exercise changes, additions or modifications this date.     Therapy Time:      PT Individual Minutes  Time In: 0251  Time Out: 7011  Minutes: 42  Timed Code Treatment Minutes: 42 Minutes  Procedure Minutes: 0    Modality Time In Time Out Total Minutes Units    Ther ex ([id number]) [id number]     Electronically signed by Anuj Parisi PTA on 12/19/22 at 4:17 PM EST

## 2022-12-21 ENCOUNTER — HOSPITAL ENCOUNTER (OUTPATIENT)
Dept: PHYSICAL THERAPY | Age: 52
Setting detail: THERAPIES SERIES
Discharge: HOME | End: 2022-12-21
Payer: COMMERCIAL

## 2022-12-21 PROCEDURE — 97110 THERAPEUTIC EXERCISES: CPT

## 2022-12-21 NOTE — PROGRESS NOTES
[facility]  Outpatient Physical Therapy    Treatment Note        Date: 2022  Patient: Cassidy Lange  : 1970   Confirmed: Yes  MRN: [id number]  Referring Provider: Ning Albrecht MD    Medical Diagnosis: Strain of unspecified muscle, fascia and tendon at shoulder and upper arm level, left arm, initial encounter [S46.912A]  Strain of muscle, fascia and tendon at neck level, initial encounter [S16. 1XXA]    Treatment Diagnosis: L shoulder and neck pain,decreased L shoulder AROM, decreased cervical AROM, decreased thoracic rotation AROM, decreased L UE strength, decreased posture, decreased activity tolerance, and signifciant soft tissue reestrictions throughout cervical and shoulder complex    Visit Information:  Insurance: Payor: [employer] / Plan: [employer] / Product Type: *No Product type* /   Secondary Insurance (if applicable):   PT Visit Information  Onset Date: 10/19/22  PT Insurance Information: [facility] claim # [id number]  Total # of Visits Approved: 12  Total # of Visits to Date: 5  Plan of Care/Certification Expiration Date: 22  No Show: 0  Progress Note Due Date: 22  Canceled Appointment: 2  Progress Note Counter:     Subjective Information:  Subjective: \"I'm doing okay today my pain is pretty much non-existant today. \"  HEP Compliance:  [x] Good [] Fair [] Poor [] Reports not doing due to:    Pain Screening  Patient Currently in Pain: Denies    Treatment:  Exercises:  Exercises  Exercise 1: L UT stretch without over pressure 20\"x5  Exercise 2: L LS stretch without over pressure 20\"x 5  Exercise 3: table slides flex, scaption 5\"x10  Exercise 4: pulleys x4 mins  Exercise 5: scap retract 5\"x10  Exercise 6: wand ex: supine chest press x10*  Exercise 7: thoracic rotation 5\"x10  Exercise 8: chin nods seated 5\"x10  Exercise 9: B cervical rotation 5\"x10  Exercise 10: finger ladder flex/scaption 5\"x5  Exercise 11: standing wand: ext, IR, ER, scaption x 10 x 5\" each  Exercise 14: PROM: L shoulder flexion / scaption. *  Exercise 20: HEP:  cont current       *Indicates exercise, modality, or manual techniques to be initiated when appropriate    Objective Measures:         Strength: [x] NT  [] MMT completed:        ROM: [x] NT  [] ROM measurements:       Balance: [x] NT       Observations: [x] NT       Assessment: Body Structures, Functions, Activity Limitations Requiring Skilled Therapeutic Intervention: Decreased posture, Increased pain, Decreased ADL status, Decreased ROM, Decreased strength, Decreased tolerance to work activity  Assessment: Introduced new activities this date to improve mobility and dynamic strength with good results. Continued with current program which is still challenging and presenting functional improvement. pt reported bicep muscle belly pain this date with prolonged activitiy that alleviated with rest. Continue to progress as tolerated. Treatment Diagnosis: L shoulder and neck pain,decreased L shoulder AROM, decreased cervical AROM, decreased thoracic rotation AROM, decreased L UE strength, decreased posture, decreased activity tolerance, and signifciant soft tissue reestrictions throughout cervical and shoulder complex  Therapy Prognosis: Good       Patient Education: [x] NA       Post-Pain Assessment:       Pain Rating (0-10 pain scale):  1 /10  fatigue  Location and pain description same as pre-treatment unless indicated.    Action: [] NA   [x] Perform HEP  [] Meds as prescribed  [] Modalities as prescribed   [] Call Physician     GOALS   Patient Goal(s): Patient Goals : \"Use my shoulder\"    Short Term Goals Completed by 2-3 weeks Goal Status   STG 1 The pt will demonstrate improved postural awareness requiring <25% VC's with exercises In progress   STG 2 The pt will have decreased L UE pain </= 3/10 at worst in order to increase ease with ADL's In progress     Long Term Goals Completed by 4-6 weeks Goal Status   LTG 1 The pt will be indep/compliant with HEP in order to self-manage symptoms upon D/C In progress   LTG 2 The pt will have an increase in UEFI score >/=70/80 and decrease in NDI score to 0/50 points in order to increase functional activity tolerance In progress   LTG 3 The pt will demonstrate improved L UE strength >/=4+/5 in order to lift/carry up to 50# for work related duties at [employer] In progress   LTG 4 The pt will demo improved L shoulder and cervical AROM WNL's and B thoracic rotation >/=75% in order to increase ease with UE ADL's In progress, Partially met            Plan:  Frequency/Duration:  Plan  Plan Frequency: 2  Plan weeks: 4-6  Current Treatment Recommendations: Strengthening, ROM, Neuromuscular re-education, Manual, Pain management, Return to work related activity, Home exercise program, Patient/Caregiver education & training, Modalities, Positioning, Dry needling  Modalities: Heat/Cold, Mechanical Traction, Ultrasound, E-stim - unattended, E-stim - manual  Pt to continue current HEP. See objective section for any therapeutic exercise changes, additions or modifications this date.     Therapy Time:      PT Individual Minutes  Time In: 4720  Time Out: 2661  Minutes: 46  Timed Code Treatment Minutes: 46 Minutes  Procedure Minutes:0    Modality Time In Time Out Total Minutes Units    Ther ex ([id number]) [phone]        Electronically signed by Albino Rosales PTA on 12/21/22 at 3:15 PM EST

## 2022-12-27 ENCOUNTER — HOSPITAL ENCOUNTER (OUTPATIENT)
Dept: PHYSICAL THERAPY | Age: 52
Setting detail: THERAPIES SERIES
Discharge: HOME | End: 2022-12-27
Payer: COMMERCIAL

## 2022-12-27 NOTE — PROGRESS NOTES
Therapy                            Cancellation/No-show Note      Date: 2022  Patient: Cherry Wagner (53 y.o. female)  : 1970  MRN:  [id number]  Referring Physician: Renee Huber MD    Medical Diagnosis: Strain of unspecified muscle, fascia and tendon at shoulder and upper arm level, left arm, initial encounter [S46.912A]  Strain of muscle, fascia and tendon at neck level, initial encounter [S16. 1XXA]      Visit Information:  Visits to Date 6   No Show/Cancelled Appts: 0 / 3      For today's appointment patient:  [x]  Cancelled  []  Rescheduled appointment  []  No-show   []  Called pt to remind of next appointment     Reason given by patient:  [x]  Patient ill  []  Conflicting appointment  []  No transportation    []  Conflict with work  []  No reason given  []  Other:      [x] Pt has future appointments scheduled, no follow up needed  [] Pt requests to be on hold.     Reason:   If > 2 weeks please discuss with therapist.  [] Therapist to call pt for follow up     Comments:       Signature: Electronically signed by Lucero Walker PT on 22 at 12:46 PM EST

## 2022-12-29 ENCOUNTER — HOSPITAL ENCOUNTER (OUTPATIENT)
Dept: PHYSICAL THERAPY | Age: 52
Setting detail: THERAPIES SERIES
Discharge: HOME | End: 2022-12-29
Payer: COMMERCIAL

## 2022-12-29 PROCEDURE — 97140 MANUAL THERAPY 1/> REGIONS: CPT

## 2022-12-29 PROCEDURE — 97110 THERAPEUTIC EXERCISES: CPT

## 2022-12-29 ASSESSMENT — PAIN DESCRIPTION - LOCATION: LOCATION: ARM

## 2022-12-29 ASSESSMENT — PAIN DESCRIPTION - FREQUENCY: FREQUENCY: CONTINUOUS

## 2022-12-29 ASSESSMENT — PAIN DESCRIPTION - ORIENTATION: ORIENTATION: LEFT

## 2022-12-29 ASSESSMENT — PAIN DESCRIPTION - DIRECTION: RADIATING_TOWARDS: LEFT BICEP

## 2022-12-29 ASSESSMENT — PAIN DESCRIPTION - DESCRIPTORS: DESCRIPTORS: ACHING

## 2022-12-29 ASSESSMENT — PAIN SCALES - GENERAL: PAINLEVEL_OUTOF10: 5

## 2022-12-29 ASSESSMENT — PAIN DESCRIPTION - PAIN TYPE: TYPE: CHRONIC PAIN

## 2022-12-29 NOTE — PROGRESS NOTES
[address] [facility]  Outpatient Physical Therapy    Treatment Note        Date: 2022  Patient: Jessica Mcbride  : 1970   Confirmed: Yes  MRN: [id number]  Referring Provider: Federico Coburn MD    Medical Diagnosis: Strain of unspecified muscle, fascia and tendon at shoulder and upper arm level, left arm, initial encounter [S46.912A]  Strain of muscle, fascia and tendon at neck level, initial encounter [S16. 1XXA]       Treatment Diagnosis: L shoulder and neck pain,decreased L shoulder AROM, decreased cervical AROM, decreased thoracic rotation AROM, decreased L UE strength, decreased posture, decreased activity tolerance, and signifciant soft tissue reestrictions throughout cervical and shoulder complex    Visit Information:  Insurance: Payor: [employer] / Plan: [employer] / Product Type: *No Product type* /   PT Visit Information  Onset Date: 10/19/22  PT Insurance Information: [facility] claim # [id number]  Total # of Visits Approved: 12  Total # of Visits to Date: 7  Plan of Care/Certification Expiration Date: 22  No Show: 0  Progress Note Due Date: 22  Canceled Appointment: 3  Progress Note Counter:     Subjective Information:  Subjective: Patient reports no pain in the shoulder, however has in pain the bicep the last couple days. \" I think I noticed it starting saturday. \"  HEP Compliance:  [x] Good [] Fair [] Poor [] Reports not doing due to:    Pain Screening  Patient Currently in Pain: Yes  Pain Assessment: 0-10  Pain Level: 5  Pain Type: Chronic pain  Pain Location: Arm  Pain Orientation: Left  Pain Radiating Towards: Left bicep  Pain Descriptors: Aching  Pain Frequency: Continuous    Treatment:  Exercises:  Exercises  Exercise 1: L UT stretch without over pressure 20\"x5  Exercise 2: L LS stretch without over pressure 20\"x 5  Exercise 3: table slides flex, scaption 5\"x10  Exercise 4: pulleys x4 mins  Exercise 6: wand ex: supine chest press x10 - slight discomfort in upper bicep(short head)  Exercise 11: standing wand: ext, IR, ER, scaption x 10 x 5\" each  Exercise 12: Bicep stretch 2 x 20s  Exercise 14: PROM: L shoulder flexion / scaption. x 8 minutes with STM  Exercise 20: HEP:  cont current       Manual:   Manual Therapy  Soft Tissue Mobilizaton: L UT, LS, periscapulars, bicep, deltoid x 8 minutes     Objective Measures:      N/A    Assessment: Body Structures, Functions, Activity Limitations Requiring Skilled Therapeutic Intervention: Decreased posture, Increased pain, Decreased ADL status, Decreased ROM, Decreased strength, Decreased tolerance to work activity  Assessment: Focused on ROM and reducing tightness in shoulder complex this date with mild relief. Patient tolerated mobilization exercises and manual techniques with tenderness and tightness felt in biceps and rear deltoid when providing STM to L shoulder complex. Educated patient appropriate sites to place TENs unit pads for pain relief with good understanding. Treatment Diagnosis: L shoulder and neck pain,decreased L shoulder AROM, decreased cervical AROM, decreased thoracic rotation AROM, decreased L UE strength, decreased posture, decreased activity tolerance, and signifciant soft tissue reestrictions throughout cervical and shoulder complex  Therapy Prognosis: Good         Post-Pain Assessment:       Pain Rating (0-10 pain scale):  4 /10   Location and pain description same as pre-treatment unless indicated.    Action: [] NA   [x] Perform HEP  [] Meds as prescribed  [] Modalities as prescribed   [] Call Physician     GOALS   Patient Goal(s): Patient Goals : \"Use my shoulder\"    Short Term Goals Completed by 2-3 weeks Goal Status   STG 1 The pt will demonstrate improved postural awareness requiring <25% VC's with exercises In progress   STG 2 The pt will have decreased L UE pain </= 3/10 at worst in order to increase ease with ADL's In progress     Long Term Goals Completed by 4-6 weeks Goal Status   LTG 1 The pt will be indep/compliant with HEP in order to self-manage symptoms upon D/C In progress   LTG 2 The pt will have an increase in UEFI score >/=70/80 and decrease in NDI score to 0/50 points in order to increase functional activity tolerance In progress   LTG 3 The pt will demonstrate improved L UE strength >/=4+/5 in order to lift/carry up to 50# for work related duties at [employer] In progress   LTG 4 The pt will demo improved L shoulder and cervical AROM WNL's and B thoracic rotation >/=75% in order to increase ease with UE ADL's In progress, Partially met     Plan:  Frequency/Duration:  Plan  Plan Frequency: 2  Plan weeks: 4-6  Current Treatment Recommendations: Strengthening, ROM, Neuromuscular re-education, Manual, Pain management, Return to work related activity, Home exercise program, Patient/Caregiver education & training, Modalities, Positioning, Dry needling  Modalities: Heat/Cold, Mechanical Traction, Ultrasound, E-stim - unattended, E-stim - manual  Pt to continue current HEP. See objective section for any therapeutic exercise changes, additions or modifications this date.     Therapy Time:      PT Individual Minutes  Time In: 2393  Time Out: 1712  Minutes: 49  Timed Code Treatment Minutes: 49 Minutes  Procedure Minutes:  0    Modality Time In Time Out Total Minutes Units    Ther ex (25307) [id number] 40 2   Manual Therapy ([phone] [id number]   Electronically signed by Ramila Carmona PTA on 12/29/22 at 5:19 PM EST

## 2023-01-04 ENCOUNTER — HOSPITAL ENCOUNTER (OUTPATIENT)
Dept: PHYSICAL THERAPY | Age: 53
Setting detail: THERAPIES SERIES
Discharge: HOME | End: 2023-01-04
Payer: COMMERCIAL

## 2023-01-04 PROCEDURE — 97110 THERAPEUTIC EXERCISES: CPT

## 2023-01-04 NOTE — PROGRESS NOTES
[address] [facility]  Outpatient Physical Therapy    Treatment Note        Date: 2023  Patient: Armin Adams  : 1970   Confirmed: Yes  MRN: [id number]  Referring Provider: Cortney Reed MD    Medical Diagnosis: Strain of unspecified muscle, fascia and tendon at shoulder and upper arm level, left arm, initial encounter [S46.912A]  Strain of muscle, fascia and tendon at neck level, initial encounter [S16. 1XXA]       Treatment Diagnosis: L shoulder and neck pain,decreased L shoulder AROM, decreased cervical AROM, decreased thoracic rotation AROM, decreased L UE strength, decreased posture, decreased activity tolerance, and signifciant soft tissue reestrictions throughout cervical and shoulder complex    Visit Information:  Insurance: Payor: [employer] / Plan: [employer] / Product Type: *No Product type* /   PT Visit Information  Onset Date: 10/19/22  PT Insurance Information: [id number] claim # [id number]  Total # of Visits Approved: 12  Total # of Visits to Date: 8  Plan of Care/Certification Expiration Date: 22  No Show: 0  Progress Note Due Date: 22  Canceled Appointment: 3  Progress Note Counter:     Subjective Information:  Subjective: Pt reports she is still on light duty for work, restricted to < 5# for lift/push/pull and nothing overhead. Pt states he motion has been improving but her shoulder feels like it has no strength in it.   HEP Compliance:  [x] Good [] Fair [] Poor [] Reports not doing due to:    Pain Screening  Patient Currently in Pain: Denies    Treatment:  Exercises:  Exercises  Exercise 1: pulleys x 3 min  Exercise 2: sidelying shoulder ER in neutral: 3 x 10, 5\" holds  Exercise 3: sidelying shoulder ABD, 0-90 deg: x 30  Exercise 4: sidelying shoulder horz ABD (posterior shoulder focus), 0-90 deg: x 30  Exercise 5: prone shoulder extension: 90 flex to 0: x 30  Exercise 6: supine shoulder horz ABD (pec focus): 0-90, x 30     *Indicates exercise, modality, or manual techniques to be initiated when appropriate    Objective Measures:     AROM LUE (degrees)  L Shoulder Flexion (0-180): 140  L Shoulder Extension (0-45): 45  L Shoulder ABduction (0-180): 90  L Shoulder Int Rotation  (0-70): L buttock with dorsum of hand  L Shoulder Ext Rotation  (0-90): T2   End range pain in all motions reported        Assessment: Body Structures, Functions, Activity Limitations Requiring Skilled Therapeutic Intervention: Decreased posture, Increased pain, Decreased ADL status, Decreased ROM, Decreased strength, Decreased tolerance to work activity  Assessment: Pt denies pain today at rest. L shoulder AROM still mildly limited compared to R shoulder with flex, ext, IR and ER with end range pain. Abduction still limited to 90 deg with pain. Started new LUE strengthening exercises today with good tolerance to exercises performed within pain free ranges. End range pain still displayed as sudden at this time. Recommend continued strengthening and progressive stretching of GHJ within pt's tolerance to restore full reaching and lifting activity. Treatment Diagnosis: L shoulder and neck pain,decreased L shoulder AROM, decreased cervical AROM, decreased thoracic rotation AROM, decreased L UE strength, decreased posture, decreased activity tolerance, and signifciant soft tissue reestrictions throughout cervical and shoulder complex  Therapy Prognosis: Good          Post-Pain Assessment:       Pain Rating (0-10 pain scale):  0 /10   Location and pain description same as pre-treatment unless indicated.    Action: [x] NA   [] Perform HEP  [] Meds as prescribed  [] Modalities as prescribed   [] Call Physician     GOALS   Patient Goal(s): Patient Goals : \"Use my shoulder\"    Short Term Goals Completed by 2-3 weeks Goal Status   STG 1 The pt will demonstrate improved postural awareness requiring <25% VC's with exercises In progress   STG 2 The pt will have decreased L UE pain </= 3/10 at worst in order to increase ease with ADL's In progress     Long Term Goals Completed by 4-6 weeks Goal Status   LTG 1 The pt will be indep/compliant with HEP in order to self-manage symptoms upon D/C In progress   LTG 2 The pt will have an increase in UEFI score >/=70/80 and decrease in NDI score to 0/50 points in order to increase functional activity tolerance In progress   LTG 3 The pt will demonstrate improved L UE strength >/=4+/5 in order to lift/carry up to 50# for work related duties at [employer] In progress   LTG 4 The pt will demo improved L shoulder and cervical AROM WNL's and B thoracic rotation >/=75% in order to increase ease with UE ADL's In progress          Plan:  Frequency/Duration:  Plan  Plan Frequency: 2  Plan weeks: 4-6  Current Treatment Recommendations: Strengthening, ROM, Neuromuscular re-education, Manual, Pain management, Return to work related activity, Home exercise program, Patient/Caregiver education & training, Modalities, Positioning, Dry needling  Modalities: Heat/Cold, Mechanical Traction, Ultrasound, E-stim - unattended, E-stim - manual  Pt to continue current HEP. See objective section for any therapeutic exercise changes, additions or modifications this date.     Therapy Time:      PT Individual Minutes  Time In: 2521  Time Out: 1700  Minutes: 45  Timed Code Treatment Minutes: 45 Minutes  Procedure Minutes: 0    Modality Time In Time Out Total Minutes Units    Ther ex (07163) [id number] 45 3   Manual Therapy (10715)       Neuro re-ed (89207)       Moist Heat/Cold Pack ([id number])       Estim unattended   ([id number])         Electronically signed by Katie Evans, PT on 1/4/23 at 5:03 PM EST

## 2023-01-06 ENCOUNTER — HOSPITAL ENCOUNTER (OUTPATIENT)
Dept: PHYSICAL THERAPY | Age: 53
Setting detail: THERAPIES SERIES
Discharge: HOME | End: 2023-01-06
Payer: COMMERCIAL

## 2023-01-06 NOTE — PROGRESS NOTES
Therapy                            Cancellation/No-show Note      Date: 2023  Patient: Annmarie Zelaya (9 y.o. female)  : 1970  MRN:  [id number]  Referring Physician: Isidro Mcdermott MD    Medical Diagnosis: Strain of unspecified muscle, fascia and tendon at shoulder and upper arm level, left arm, initial encounter [S46.912A]  Strain of muscle, fascia and tendon at neck level, initial encounter [S16. 1XXA]      Visit Information:  Visits to Date 9   No Show/Cancelled Appts: 1 / 3      For today's appointment patient:  []  Cancelled  []  Rescheduled appointment  [x]  No-show   [x]  Called pt to remind of next appointment     Reason given by patient:  []  Patient ill  []  Conflicting appointment  []  No transportation    []  Conflict with work  [x]  No reason given  []  Other:      [x] Pt has future appointments scheduled, no follow up needed  [] Pt requests to be on hold.     Reason:   If > 2 weeks please discuss with therapist.  [] Therapist to call pt for follow up     Comments:   LVM reminder of missed appt and next scheduled appt    Signature: Electronically signed by Parker Fulton PTA on 23 at 3:38 PM EST

## 2023-01-09 ENCOUNTER — HOSPITAL ENCOUNTER (OUTPATIENT)
Dept: PHYSICAL THERAPY | Age: 53
Setting detail: THERAPIES SERIES
Discharge: HOME | End: 2023-01-09
Payer: COMMERCIAL

## 2023-01-09 PROCEDURE — 97110 THERAPEUTIC EXERCISES: CPT

## 2023-01-09 NOTE — PROGRESS NOTES
[facility] [facility]  Outpatient Physical Therapy    Treatment Note        Date: 2023  Patient: Darci Stephens  : 1970   Confirmed: Yes  MRN: [id number]  Referring Provider: Mary Carmen Thomas MD    Medical Diagnosis: Strain of unspecified muscle, fascia and tendon at shoulder and upper arm level, left arm, initial encounter [S46.912A]  Strain of muscle, fascia and tendon at neck level, initial encounter [S16. 1XXA]       Treatment Diagnosis: L shoulder and neck pain,decreased L shoulder AROM, decreased cervical AROM, decreased thoracic rotation AROM, decreased L UE strength, decreased posture, decreased activity tolerance, and signifciant soft tissue reestrictions throughout cervical and shoulder complex    Visit Information:  Insurance: Payor: [employer] / Plan: [employer] / Product Type: *No Product type* /   PT Visit Information  Onset Date: 10/19/22  PT Insurance Information: [id number] claim # [id number]  Total # of Visits Approved: 12  Total # of Visits to Date: 10  Plan of Care/Certification Expiration Date: 22  No Show: 1  Progress Note Due Date: 22  Canceled Appointment: 3  Progress Note Counter: 10/12    Subjective Information:  Subjective: Pt reports no pain or complaints upon arrival to therapy, no complaints from  HEP Compliance:  [x] Good [] Fair [] Poor [] Reports not doing due to:    Pain Screening  Patient Currently in Pain: Denies    Treatment:  Exercises:  Exercises  Exercise 1: UBE 2' / 2' L 1.0  Exercise 2: sidelying shoulder ER in neutral: 1#  x 10, 5\" holds  Exercise 3: sidelying shoulder ABD, 0-90 deg: x 10  Exercise 4: sidelying shoulder horz ABD (posterior shoulder focus), 0-90 deg: x 10  Exercise 5: prone shoulder extension: 90 flex to 0: 1# x 10, Horz ABD 0# x 10 through limited Range.   Exercise 6: supine shoulder horz ABD (pec focus): 0-90, x 10  Exercise 7: thoracic rotation  Open Book 5\"x10 (left)  Exercise 13: Wall slides w/ lift off x 10  Exercise 15: T-band Rows, Lat pulls, IR,ER RTB x 10 ea. Exercise 16: Standing Horz. Chest pulls RTB w/ IR & ER RTB x 10 ea. Objective Measures:           STG 2 Current Status[de-identified] Pt reports pain with normal ADLs 0/10 @ best, 9/10 @ worse over the last 7 days. Assessment: Body Structures, Functions, Activity Limitations Requiring Skilled Therapeutic Intervention: Decreased posture, Increased pain, Decreased ADL status, Decreased ROM, Decreased strength, Decreased tolerance to work activity  Assessment: Reviewed and discussed importance of compliance with attendance, pt stated will improve, requested to change appointment times, pt instructed to see secretaries following tx session. Continue to progress exercises as tolerated with introduction of weights with decreased reps performed today due to fatigue levels. Treatment Diagnosis: L shoulder and neck pain,decreased L shoulder AROM, decreased cervical AROM, decreased thoracic rotation AROM, decreased L UE strength, decreased posture, decreased activity tolerance, and signifciant soft tissue reestrictions throughout cervical and shoulder complex  Therapy Prognosis: Good       Patient Education: [x] NA       Post-Pain Assessment:       Pain Rating (0-10 pain scale):   0/10   Location and pain description same as pre-treatment unless indicated.    Action: [] NA   [x] Perform HEP  [] Meds as prescribed  [] Modalities as prescribed   [] Call Physician     GOALS   Patient Goal(s): Patient Goals : \"Use my shoulder\"    Short Term Goals Completed by 2-3 weeks Goal Status   STG 1 The pt will demonstrate improved postural awareness requiring <25% VC's with exercises In progress   STG 2 The pt will have decreased L UE pain </= 3/10 at worst in order to increase ease with ADL's In progress     Long Term Goals Completed by 4-6 weeks Goal Status   LTG 1 The pt will be indep/compliant with HEP in order to self-manage symptoms upon D/C In progress   LTG 2 The pt will have an increase in UEFI score >/=70/80 and decrease in NDI score to 0/50 points in order to increase functional activity tolerance In progress   LTG 3 The pt will demonstrate improved L UE strength >/=4+/5 in order to lift/carry up to 50# for work related duties at [employer] In progress   LTG 4 The pt will demo improved L shoulder and cervical AROM WNL's and B thoracic rotation >/=75% in order to increase ease with UE ADL's In progress       Plan:  Frequency/Duration:  Plan  Plan Frequency: 2  Plan weeks: 4-6  Current Treatment Recommendations: Strengthening, ROM, Neuromuscular re-education, Manual, Pain management, Return to work related activity, Home exercise program, Patient/Caregiver education & training, Modalities, Positioning, Dry needling  Modalities: Heat/Cold, Mechanical Traction, Ultrasound, E-stim - unattended, E-stim - manual  Pt to continue current HEP. See objective section for any therapeutic exercise changes, additions or modifications this date.     Therapy Time:      PT Individual Minutes  Time In: 7609  Time Out: 0536  Minutes: 41  Timed Code Treatment Minutes: 41 Minutes  Procedure Minutes:0    Modality Time In Time Out Total Minutes Units    Ther ex (75207) [id number] 3     Electronically signed by Shadia Spear PTA on 1/9/23 at 5:45 PM EST

## 2023-01-11 ENCOUNTER — HOSPITAL ENCOUNTER (OUTPATIENT)
Dept: PHYSICAL THERAPY | Age: 53
Setting detail: THERAPIES SERIES
Discharge: HOME | End: 2023-01-11
Payer: COMMERCIAL

## 2023-01-11 PROCEDURE — 97110 THERAPEUTIC EXERCISES: CPT

## 2023-01-11 NOTE — PROGRESS NOTES
[address] [facility]  Outpatient Physical Therapy    Treatment Note        Date: 2023  Patient: Nick Hutchison  : 1970   Confirmed: Yes  MRN: [id number]  Referring Provider: Maria Antonia Ivy MD    Medical Diagnosis: Strain of unspecified muscle, fascia and tendon at shoulder and upper arm level, left arm, initial encounter [S46.912A]  Strain of muscle, fascia and tendon at neck level, initial encounter [S16. 1XXA]       Treatment Diagnosis: L shoulder and neck pain,decreased L shoulder AROM, decreased cervical AROM, decreased thoracic rotation AROM, decreased L UE strength, decreased posture, decreased activity tolerance, and signifciant soft tissue reestrictions throughout cervical and shoulder complex    Visit Information:  Insurance: Payor: [employer] / Plan: [employer] / Product Type: *No Product type* /   PT Visit Information  Onset Date: 10/19/22  PT Insurance Information: [facility] claim # [id number]  Total # of Visits Approved: 12  Total # of Visits to Date: 11  Plan of Care/Certification Expiration Date: 22  No Show: 1  Progress Note Due Date: 22  Canceled Appointment: 3  Progress Note Counter:     Subjective Information:  Subjective: Pt denies pain currently and has no complaints following last visit. Pt reports functioning at 75-80% currently. Pt cont's to report pain at L elbow w/ certain movements, but states \"the shoulder doesn't usually hurt. \" Pt saw [facility] CNP who changed restrictions to LUE </=5#, B UE's together </=15#  HEP Compliance:  [x] Good [] Fair [] Poor [] Reports not doing due to:    Pain Screening  Patient Currently in Pain: Denies    Treatment:  Exercises:  Exercises  Exercise 1: UBE 2.5' / 2.5' L 2.0  Exercise 2: sidelying shoulder ER in neutral: 1#  x 10, 5\" holds  Exercise 3: sidelying shoulder ABD, 0-90 deg: x 10 1#  Exercise 4: sidelying shoulder horz ABD (posterior shoulder focus), 0-90 deg: x 10 1#  Exercise 5: prone shoulder extension: 90 flex to 0: 1# x 10, row 1# x10, Horz ABD 0# x 10 through limited Range. Exercise 7: thoracic rotation  Open Book 5\"x10 (left)  Exercise 13: Wall slides w/ lift off x 10  Exercise 15: T-band Rows, Lat pulls, IR,ER RTB x 15 ea. Exercise 16: Standing Horz. Chest pulls RTB w/ IR & ER RTB x 15 ea. Treatment Reasoning  Limitations addressed: Mobility, Flexibility    *Indicates exercise, modality, or manual techniques to be initiated when appropriate    Objective Measures:     LTG 4 Current Status[de-identified] 1/11/23 L shoulder flex 150, abd 90, ER T3, IR PSIS; B thoracic rotation 75%; cerv flex 60, ext 50, B SB 40, L rot 70, R rot 75      Assessment: Body Structures, Functions, Activity Limitations Requiring Skilled Therapeutic Intervention: Decreased posture, Increased pain, Decreased ADL status, Decreased ROM, Decreased strength, Decreased tolerance to work activity  Assessment: Pt demo's improved cervical and L shoulder AROM since beginning PT, however cont's to be limited w/ L shoulder abd and IR AROM d/t pain. Cont to progress therex for increased strength w/ pt denying pain in L shoulder, but notes pain in distal triceps/elbow. Treatment Diagnosis: L shoulder and neck pain,decreased L shoulder AROM, decreased cervical AROM, decreased thoracic rotation AROM, decreased L UE strength, decreased posture, decreased activity tolerance, and signifciant soft tissue reestrictions throughout cervical and shoulder complex  Therapy Prognosis: Good       Patient Education: [] NA   POC    Post-Pain Assessment:       Pain Rating (0-10 pain scale):  0 /10   Location and pain description same as pre-treatment unless indicated.    Action: [] NA   [x] Perform HEP  [] Meds as prescribed  [] Modalities as prescribed   [] Call Physician     GOALS   Patient Goal(s): Patient Goals : \"Use my shoulder\"    Short Term Goals Completed by 2-3 weeks Goal Status   STG 1 The pt will demonstrate improved postural awareness requiring <25% VC's with exercises In progress   STG 2 The pt will have decreased L UE pain </= 3/10 at worst in order to increase ease with ADL's In progress     Long Term Goals Completed by 4-6 weeks Goal Status   LTG 1 The pt will be indep/compliant with HEP in order to self-manage symptoms upon D/C In progress   LTG 2 The pt will have an increase in UEFI score >/=70/80 and decrease in NDI score to 0/50 points in order to increase functional activity tolerance In progress   LTG 3 The pt will demonstrate improved L UE strength >/=4+/5 in order to lift/carry up to 50# for work related duties at [employer] In progress   LTG 4 The pt will demo improved L shoulder and cervical AROM WNL's and B thoracic rotation >/=75% in order to increase ease with UE ADL's In progress, Partially met          Plan:  Frequency/Duration:  Plan  Plan Frequency: 2  Plan weeks: 4-6  Specific Instructions for Next Treatment: PN to cont w/ emphasis on work-related duties  Current Treatment Recommendations: Strengthening, ROM, Neuromuscular re-education, Manual, Pain management, Return to work related activity, Home exercise program, Patient/Caregiver education & training, Modalities, Positioning, Dry needling  Modalities: Heat/Cold, Mechanical Traction, Ultrasound, E-stim - unattended, E-stim - manual  Pt to continue current HEP. See objective section for any therapeutic exercise changes, additions or modifications this date.     Therapy Time:      PT Individual Minutes  Time In: 9022  Time Out: 6322  Minutes: 38  Timed Code Treatment Minutes: 38 Minutes  Procedure Minutes: 0    Modality Time In Time Out Total Minutes Units    Ther ex ([id number]) [id number] [phone]     Electronically signed by Tim Han PTA on 1/11/23 at 11:14 AM EST

## 2023-01-16 ENCOUNTER — HOSPITAL ENCOUNTER (OUTPATIENT)
Dept: PHYSICAL THERAPY | Age: 53
Setting detail: THERAPIES SERIES
Discharge: HOME | End: 2023-01-16
Payer: COMMERCIAL

## 2023-01-16 PROCEDURE — 97110 THERAPEUTIC EXERCISES: CPT

## 2023-01-16 NOTE — PROGRESS NOTES
[address]  PHYSICAL THERAPY PLAN OF CARE   [address], [address] [facility]         Ph: [phone]  Fax: [phone]    [] Certification  [] Recertification []  Plan of Care  [x] Progress Note [] Discharge      Referring Provider: Mary Beth Estrada MD     From:  Dominik Mas, PT, DPT   Patient: Cristóbal Martinez (44 y.o. female) : 1970 Date: 2023  Medical Diagnosis: Strain of unspecified muscle, fascia and tendon at shoulder and upper arm level, left arm, initial encounter [Q13.205L]  Strain of muscle, fascia and tendon at neck level, initial encounter [S16. 1XXA]       Treatment Diagnosis: L shoulder and neck pain,decreased L shoulder AROM, decreased cervical AROM, decreased thoracic rotation AROM, decreased L UE strength, decreased posture, decreased activity tolerance, and signifciant soft tissue reestrictions throughout cervical and shoulder complex    Plan of Care/Certification Expiration Date: 22   Progress Report Period from:  2022  to 2023    Visits to Date: 12 No Show: 1 Cancelled Appts: 3    OBJECTIVE:   Short Term Goals - Time Frame for Short Term Goals: 2-3 weeks    Goals Current/Discharge status  Status   Short Term Goal 1: The pt will demonstrate improved postural awareness requiring <25% VC's with exercises  STG 1 Current Status[de-identified] Improved postural awarness with decreased cues. In progress, Partially met   Short Term Goal 2: The pt will have decreased L UE pain </= 3/10 at worst in order to increase ease with ADL's  STG 2 Current Status[de-identified] Pt reports pain with normal ADLs 0/10 @ best, 4/10 @ worse over the last 7 days.    In progress, Partially met     Long Term Goals - Time Frame for Long Term Goals : 4-6 weeks  Goals Current/ Discharge status Status   Long Term Goal 1: The pt will be indep/compliant with HEP in order to self-manage symptoms upon D/C LTG 1 Current Status[de-identified] Pt reports good compliance with current, progressing as tolerated. In progress, Partially met   Long Term Goal 2: The pt will have an increase in UEFI score >/=70/80 and decrease in NDI score to 0/50 points in order to increase functional activity tolerance LTG 2 Current Status[de-identified] 1/16/23 UEFI Score 72/80,  NDI score 2/50   In progress, Partially met   Long Term Goal 3: The pt will demonstrate improved L UE strength >/=4+/5 in order to lift/carry up to 50# for work related duties at [employer] LTG 3 Current Status[de-identified] L UE strength: Flexion 4+/5, ABD 4/5 w/ pain. IR,ER 5/5, Elbwo Flex 5/5 w/ mild pain  ,Ext 5/5   In progress, Partially met   Long Term Goal 4: The pt will demo improved L shoulder and cervical AROM WNL's and B thoracic rotation >/=75% in order to increase ease with UE ADL's LTG 4 Current Status[de-identified] 1/11/23 L shoulder flex 150, abd 90, ER T3, IR PSIS; B thoracic rotation 75%; cerv flex 60, ext 50, B SB 40, L rot 70, R rot 75   In progress, Partially met     Body Structures, Functions, Activity Limitations Requiring Skilled Therapeutic Intervention: Decreased posture, Increased pain, Decreased ADL status, Decreased ROM, Decreased strength, Decreased tolerance to work activity  Assessment: Pt demos good progress towards goals though conts to have ongoing deficits as noted above. Pt also with increasing activity tolerance though limitations still present. Pt would benefit from additional visit to address current deficits in ROM and strength to allow for improved ease and performance of ADLs and work duties.   Therapy Prognosis: Good    PLAN:   Frequency/Duration:  Plan Frequency: 2  Plan weeks: 3-4 weeks requested  Current Treatment Recommendations: Strengthening, ROM, Neuromuscular re-education, Manual, Pain management, Return to work related activity, Home exercise program, Patient/Caregiver education & training, Modalities, Positioning, Dry needling  Modalities: Heat/Cold, Mechanical Traction, Ultrasound, E-stim - unattended, E-stim - manual                  Patient Status:[] Continue/ Initiate plan of Care     [] Discharge PT. Recommend pt continue with HEP. [x] Additional visits requested, Please re-certify for additional visits: 6-8 visits      [] Hold        Signature: Electronically signed by Stefanie Clark PT on 1/16/2023 at 3:15 PM  Objective information by: Electronically signed by Lee Sandoval PTA on 1/16/23 at 2:40 PM EST    If you have any questions or concerns, please don't hesitate to call. Thank you for your referral.    I have reviewed this plan of care and certify a need for medically necessary rehabilitation services.     Physician Signature:__________________________________________________________  Date:  Please sign and return

## 2023-01-16 NOTE — PROGRESS NOTES
[address] [facility]  Outpatient Physical Therapy    Treatment Note        Date: 2023  Patient: Max Pratt  : 1970   Confirmed: Yes  MRN: [id number]  Referring Provider: Kaylah Jain MD    Medical Diagnosis: Strain of unspecified muscle, fascia and tendon at shoulder and upper arm level, left arm, initial encounter [S46.912A]  Strain of muscle, fascia and tendon at neck level, initial encounter [S16. 1XXA]       Treatment Diagnosis: L shoulder and neck pain,decreased L shoulder AROM, decreased cervical AROM, decreased thoracic rotation AROM, decreased L UE strength, decreased posture, decreased activity tolerance, and signifciant soft tissue reestrictions throughout cervical and shoulder complex    Visit Information:  Insurance: Payor: [employer] / Plan: [employer] / Product Type: *No Product type* /   PT Visit Information  Onset Date: 10/19/22  PT Insurance Information: [facility] claim # [id number]  Total # of Visits Approved: 12  Total # of Visits to Date: 10  Plan of Care/Certification Expiration Date: 22  No Show: 1  Progress Note Due Date: 22  Canceled Appointment: 3  Progress Note Counter: 10/12    Subjective Information:  Subjective: Pt reports continued difficulty with lifting arm at or above head height. Pt reports therapy has been helping, would like to get better with movement's out to the side and reaching up. No complaints or issues with the neck only current limitations is with left shoulder. HEP Compliance:  [x] Good [] Fair [] Poor [] Reports not doing due to:    Pain Screening  Patient Currently in Pain: Denies    Treatment:  Exercises:  Exercises  Exercise 1: UBE 2.5' / 2.5' L 2.5  Exercise 10: Standing Wall [place] ( left arm below 90* due to pain)  Exercise 13: Wall slides w/ lift off x 10  Exercise 15: T-band Rows, Lat pulls, IR,ER, Flex, ADD RTB x 15 ea. ABD YTB x 15  Exercise 16: Standing Horz.  Chest pulls RTB w/ IR & ER RTB x 15 alton.  Exercise 17: Standing KB high pull 7.5# x10  Exercise 20: HEP:  cont current         Objective Measures:          STG 1 Current Status[de-identified] Improved postural awarness with decreased cues. STG 2 Current Status[de-identified] Pt reports pain with normal ADLs 0/10 @ best, 4/10 @ worse over the last 7 days. LTG 1 Current Status[de-identified] Pt reports good compliance with current, progressing as tolerated. LTG 2 Current Status[de-identified] 1/16/23 UEFI Score 72/80,  NDI score 2/50  LTG 3 Current Status[de-identified] L UE strength: Flexion 4+/5, ABD 4/5 w/ pain. IR,ER 5/5, Elbwo Flex 5/5 w/ mild pain  ,Ext 5/5  LTG 4 Current Status[de-identified] 1/11/23 L shoulder flex 150, abd 90, ER T3, IR PSIS; B thoracic rotation 75%; cerv flex 60, ext 50, B SB 40, L rot 70, R rot 75             Assessment: Body Structures, Functions, Activity Limitations Requiring Skilled Therapeutic Intervention: Decreased posture, Increased pain, Decreased ADL status, Decreased ROM, Decreased strength, Decreased tolerance to work activity  Assessment: Progressed functional reaching tasks to improve ROM and strength of Left UE. Pt with reported pain with shoulder ABD with MMTing. Pt with improved scores in UEFI of 72/80 (13 pt improvement)  NDI 2/50 ( 7 pt improvement) Pt would benefit from additional visit to address current deficits in ROM ans strength to allow for improved ease and performance of ADLs and work duties. Treatment Diagnosis: L shoulder and neck pain,decreased L shoulder AROM, decreased cervical AROM, decreased thoracic rotation AROM, decreased L UE strength, decreased posture, decreased activity tolerance, and signifciant soft tissue reestrictions throughout cervical and shoulder complex  Therapy Prognosis: Good       Patient Education: [x] NA       Post-Pain Assessment:       Pain Rating (0-10 pain scale):   2/10   Location and pain description same as pre-treatment unless indicated.    Action: [] NA   [x] Perform HEP  [] Meds as prescribed  [] Modalities as prescribed [] Call Physician     GOALS   Patient Goal(s): Patient Goals : \"Use my shoulder\"    Short Term Goals Completed by 2-3 weeks Goal Status   STG 1 The pt will demonstrate improved postural awareness requiring <25% VC's with exercises In progress   STG 2 The pt will have decreased L UE pain </= 3/10 at worst in order to increase ease with ADL's In progress, Partially met     Long Term Goals Completed by 4-6 weeks Goal Status   LTG 1 The pt will be indep/compliant with HEP in order to self-manage symptoms upon D/C In progress   LTG 2 The pt will have an increase in UEFI score >/=70/80 and decrease in NDI score to 0/50 points in order to increase functional activity tolerance In progress, Partially met   LTG 3 The pt will demonstrate improved L UE strength >/=4+/5 in order to lift/carry up to 50# for work related duties at [employer] In progress   LTG 4 The pt will demo improved L shoulder and cervical AROM WNL's and B thoracic rotation >/=75% in order to increase ease with UE ADL's In progress, Partially met       Plan:  Frequency/Duration:  Plan  Plan Frequency: 2  Plan weeks: 4-6  Current Treatment Recommendations: Strengthening, ROM, Neuromuscular re-education, Manual, Pain management, Return to work related activity, Home exercise program, Patient/Caregiver education & training, Modalities, Positioning, Dry needling  Modalities: Heat/Cold, Mechanical Traction, Ultrasound, E-stim - unattended, E-stim - manual  Pt to continue current HEP. See objective section for any therapeutic exercise changes, additions or modifications this date. Therapy Time:      PT Individual Minutes  Time In: 9871  Time Out: [facility]  Minutes: 43  Timed Code Treatment Minutes: 43 Minutes  Procedure Minutes:0    Modality Time In Time Out Total Minutes Units    Ther ex ([id number]) [id number]   ed by Arabella Bejarano PTA on 1/16/23 at 2:36 PM EST  Addendum made for correction of visit count.  Electronically signed by Arabella Bejarano PTA on 1/17/23 at 12:00 PM EST

## 2023-01-18 ENCOUNTER — HOSPITAL ENCOUNTER (OUTPATIENT)
Dept: PHYSICAL THERAPY | Age: 53
Setting detail: THERAPIES SERIES
Discharge: HOME | End: 2023-01-18
Payer: COMMERCIAL

## 2023-01-18 PROCEDURE — 97110 THERAPEUTIC EXERCISES: CPT

## 2023-01-18 NOTE — PROGRESS NOTES
[address] [facility]  Outpatient Physical Therapy    Treatment Note        Date: 2023  Patient: Dameon Cortez  : 1970   Confirmed: Yes  MRN: [id number]  Referring Provider: Ji Taylor MD    Medical Diagnosis: Strain of unspecified muscle, fascia and tendon at shoulder and upper arm level, left arm, initial encounter [S46.912A]  Strain of muscle, fascia and tendon at neck level, initial encounter [S16. 1XXA]       Treatment Diagnosis: L shoulder and neck pain,decreased L shoulder AROM, decreased cervical AROM, decreased thoracic rotation AROM, decreased L UE strength, decreased posture, decreased activity tolerance, and signifciant soft tissue reestrictions throughout cervical and shoulder complex    Visit Information:  Insurance: Payor: [employer] / Plan: [employer] / Product Type: *No Product type* /   PT Visit Information  Onset Date: 10/19/22  PT Insurance Information: [id number] claim # [id number]  Total # of Visits Approved: 12  Total # of Visits to Date: 11  Plan of Care/Certification Expiration Date: 22  No Show: 1  Progress Note Due Date: 22  Canceled Appointment: 3  Progress Note Counter:     Subjective Information:  Subjective: Pt denies pain or soreness currently or following last visit. Pt reports she's been trying to do more overhead activities at home like reaching into the cabinet w/ L UE. HEP Compliance:  [x] Good [] Fair [] Poor [] Reports not doing due to:    Pain Screening  Patient Currently in Pain: Denies    Treatment:  Exercises:  Exercises  Exercise 1: UBE 3.0' / 3.0' L 3.0  Exercise 10: Standing Wall [place] ( left arm below 90* due to pain)  Exercise 12: reaching at number wall 1kg ball 0-9 x3  Exercise 13: Wall slides w/ lift off x 10  Exercise 14: ball stabs at wall 4-way x10 (rest break half way through directions)  Exercise 15: T-band Rows, Lat pulls, IR,ER, Flex, ADD RTB x 15 ea. ABD YTB x 15  Exercise 16: Standing Horz. Chest pulls RTB w/ IR & ER RTB x 15 ea. Exercise 17: Standing KB high pull 7.5# x10  Treatment Reasoning  Limitations addressed: Mobility, Flexibility    *Indicates exercise, modality, or manual techniques to be initiated when appropriate    Objective Measures:       Assessment: Body Structures, Functions, Activity Limitations Requiring Skilled Therapeutic Intervention: Decreased posture, Increased pain, Decreased ADL status, Decreased ROM, Decreased strength, Decreased tolerance to work activity  Assessment: Cont to progress therex w/ initiation of ball stabs and reaching at number wall w/ pt reporting increased fatigue, though no significant increases in pain. Short rest breaks between tasks needed. Pt cont's to be challenged w/ wall angels and limited ROM. Treatment Diagnosis: L shoulder and neck pain,decreased L shoulder AROM, decreased cervical AROM, decreased thoracic rotation AROM, decreased L UE strength, decreased posture, decreased activity tolerance, and signifciant soft tissue reestrictions throughout cervical and shoulder complex  Therapy Prognosis: Good       Patient Education: [x] NA       Post-Pain Assessment:       Pain Rating (0-10 pain scale):   0/10   Location and pain description same as pre-treatment unless indicated.    Action: [] NA   [x] Perform HEP  [] Meds as prescribed  [] Modalities as prescribed   [] Call Physician     GOALS   Patient Goal(s): Patient Goals : \"Use my shoulder\"    Short Term Goals Completed by 2-3 weeks Goal Status   STG 1 The pt will demonstrate improved postural awareness requiring <25% VC's with exercises In progress, Partially met   STG 2 The pt will have decreased L UE pain </= 3/10 at worst in order to increase ease with ADL's In progress, Partially met     Long Term Goals Completed by 4-6 weeks Goal Status   LTG 1 The pt will be indep/compliant with HEP in order to self-manage symptoms upon D/C In progress, Partially met   LTG 2 The pt will have an increase in UEFI score >/=70/80 and decrease in NDI score to 0/50 points in order to increase functional activity tolerance In progress, Partially met   LTG 3 The pt will demonstrate improved L UE strength >/=4+/5 in order to lift/carry up to 50# for work related duties at [employer] In progress, Partially met   LTG 4 The pt will demo improved L shoulder and cervical AROM WNL's and B thoracic rotation >/=75% in order to increase ease with UE ADL's In progress, Partially met          Plan:  Frequency/Duration:  Plan  Plan Frequency: 2  Plan weeks: 3-4 weeks requested  Specific Instructions for Next Treatment: progress HEP  Current Treatment Recommendations: Strengthening, ROM, Neuromuscular re-education, Manual, Pain management, Return to work related activity, Home exercise program, Patient/Caregiver education & training, Modalities, Positioning, Dry needling  Modalities: Heat/Cold, Mechanical Traction, Ultrasound, E-stim - unattended, E-stim - manual  Pt to continue current HEP. See objective section for any therapeutic exercise changes, additions or modifications this date.     Therapy Time:      PT Individual Minutes  Time In: 1400  Time Out: 7951  Minutes: 38  Timed Code Treatment Minutes: 38 Minutes  Procedure Minutes: 0    Modality Time In Time Out Total Minutes Units    Ther ex ([id number]) [id number]     Electronically signed by Eliseo Guaman PTA on 1/18/23 at 2:05 PM EST

## 2023-01-20 ENCOUNTER — HOSPITAL ENCOUNTER (OUTPATIENT)
Dept: PHYSICAL THERAPY | Age: 53
Setting detail: THERAPIES SERIES
Discharge: HOME | End: 2023-01-20
Payer: COMMERCIAL

## 2023-01-20 PROCEDURE — 97110 THERAPEUTIC EXERCISES: CPT

## 2023-01-20 ASSESSMENT — PAIN DESCRIPTION - ORIENTATION: ORIENTATION: LEFT

## 2023-01-20 ASSESSMENT — PAIN DESCRIPTION - PAIN TYPE: TYPE: CHRONIC PAIN

## 2023-01-20 ASSESSMENT — PAIN DESCRIPTION - DESCRIPTORS: DESCRIPTORS: ACHING

## 2023-01-20 ASSESSMENT — PAIN DESCRIPTION - LOCATION: LOCATION: ARM

## 2023-01-20 ASSESSMENT — PAIN SCALES - GENERAL: PAINLEVEL_OUTOF10: 7

## 2023-01-20 NOTE — PROGRESS NOTES
[facility] [facility]  Outpatient Physical Therapy    Treatment Note        Date: 2023  Patient: Lyubov Desai  : 1970   Confirmed: Yes  MRN: [id number]  Referring Provider: Noy Mcdonald MD    Medical Diagnosis: Strain of unspecified muscle, fascia and tendon at shoulder and upper arm level, left arm, initial encounter [S46.912A]  Strain of muscle, fascia and tendon at neck level, initial encounter [S16. 1XXA]       Treatment Diagnosis: L shoulder and neck pain,decreased L shoulder AROM, decreased cervical AROM, decreased thoracic rotation AROM, decreased L UE strength, decreased posture, decreased activity tolerance, and signifciant soft tissue reestrictions throughout cervical and shoulder complex    Visit Information:  Insurance: Payor: [employer] / Plan: [employer] / Product Type: *No Product type* /   PT Visit Information  Onset Date: 10/19/22  PT Insurance Information: [facility] claim # [id number]  Total # of Visits Approved: 12  Total # of Visits to Date: 12  Plan of Care/Certification Expiration Date: 22  No Show: 1  Progress Note Due Date: 22  Canceled Appointment: 3  Progress Note Counter:     Subjective Information:  Subjective: Pt arrived to therapy today reporting elevated pain levels for unknown reasosns, woke up with increased pain of 7/10 currently. HEP Compliance:  [x] Good [] Fair [] Poor [] Reports not doing due to:    Pain Screening  Patient Currently in Pain: Yes  Pain Assessment: 0-10  Pain Level: 7  Pain Type: Chronic pain  Pain Location: Arm  Pain Orientation: Left  Pain Descriptors: Aching    Treatment:  Exercises:  Exercises  Exercise 7: thoracic rotation  Open Book 5\"x10 (left) * attempted w/ reports of shooting/burning pain down left UE. Exercise 8: UT stretch / Levator 30\" x 3  Exercise 9: B cervical rotation 5\"x10  Exercise 15: T-band Rows, Lat pulls, IR,ER, Flex, ADD RTB x 15 ea.  ABD YTB x 15 ( performed all with Yellow today due to elevated pain levels)  Exercise 16: Standing Horz. Chest pulls RTB w/ IR & ER RTB x 15 ea. ( performed in supine today due to pain)       Objective Measures:            Assessment: Body Structures, Functions, Activity Limitations Requiring Skilled Therapeutic Intervention: Decreased posture, Increased pain, Decreased ADL status, Decreased ROM, Decreased strength, Decreased tolerance to work activity  Assessment: Modified multiple exercises today due to elevated pain levels reported at start of tx session with no known reasons reported. Returned to basice movements and stretches in order to decrease pain levels. Pt unable to perform SL open book stretch today due to shooting burning pain reported on first attempt, held additional trials at this time. Instructed pt to keep exercises at minimal and focus on stretches and modalities as needed over the weeked, call MD if pain levels continue to get worse and become unmanageable to current HEP. Treatment Diagnosis: L shoulder and neck pain,decreased L shoulder AROM, decreased cervical AROM, decreased thoracic rotation AROM, decreased L UE strength, decreased posture, decreased activity tolerance, and signifciant soft tissue reestrictions throughout cervical and shoulder complex  Therapy Prognosis: Good       Patient Education: [x] NA       Post-Pain Assessment:       Pain Rating (0-10 pain scale):   2/10   Location and pain description same as pre-treatment unless indicated.    Action: [] NA   [x] Perform HEP  [] Meds as prescribed  [] Modalities as prescribed   [] Call Physician     GOALS   Patient Goal(s): Patient Goals : \"Use my shoulder\"    Short Term Goals Completed by 2-3 weeks Goal Status   STG 1 The pt will demonstrate improved postural awareness requiring <25% VC's with exercises In progress, Partially met   STG 2 The pt will have decreased L UE pain </= 3/10 at worst in order to increase ease with ADL's In progress, Partially met     Long Term Goals Completed by 4-6 weeks Goal Status   LTG 1 The pt will be indep/compliant with HEP in order to self-manage symptoms upon D/C In progress, Partially met   LTG 2 The pt will have an increase in UEFI score >/=70/80 and decrease in NDI score to 0/50 points in order to increase functional activity tolerance In progress, Partially met   LTG 3 The pt will demonstrate improved L UE strength >/=4+/5 in order to lift/carry up to 50# for work related duties at [employer] In progress, Partially met   LTG 4 The pt will demo improved L shoulder and cervical AROM WNL's and B thoracic rotation >/=75% in order to increase ease with UE ADL's In progress, Partially met     Plan:  Frequency/Duration:  Plan  Plan Frequency: 2  Plan weeks: 3-4 weeks requested  Specific Instructions for Next Treatment: progress HEP ( held this visit due to pain level increase)  Current Treatment Recommendations: Strengthening, ROM, Neuromuscular re-education, Manual, Pain management, Return to work related activity, Home exercise program, Patient/Caregiver education & training, Modalities, Positioning, Dry needling  Modalities: Heat/Cold, Mechanical Traction, Ultrasound, E-stim - unattended, E-stim - manual  Additional Comments: Pt on hold while wainting for approval for additional visits. Pt to continue current HEP. See objective section for any therapeutic exercise changes, additions or modifications this date.     Therapy Time:      PT Individual Minutes  Time In: 3234  Time Out: 9581  Minutes: 39  Timed Code Treatment Minutes: 39 Minutes  Procedure Minutes:0    Modality Time In Time Out Total Minutes Units    Ther ex ([id number]) [phone]     Electronically signed by Ella Wagner PTA on 1/20/23 at 10:14 AM EST

## 2023-01-31 ENCOUNTER — HOSPITAL ENCOUNTER (OUTPATIENT)
Dept: PHYSICAL THERAPY | Age: 53
Setting detail: THERAPIES SERIES
Discharge: HOME | End: 2023-01-31
Payer: COMMERCIAL

## 2023-01-31 PROCEDURE — 97110 THERAPEUTIC EXERCISES: CPT

## 2023-01-31 NOTE — PROGRESS NOTES
[facility]  Outpatient Physical Therapy    Treatment Note        Date: 2023  Patient: Mae Hyatt  : 1970   Confirmed: Yes  MRN: [id number]  Referring Provider: Darien Fraga MD    Medical Diagnosis: Strain of unspecified muscle, fascia and tendon at shoulder and upper arm level, left arm, initial encounter [S46.912A]  Strain of muscle, fascia and tendon at neck level, initial encounter [S16.1XXA]       Treatment Diagnosis: L shoulder and neck pain,decreased L shoulder AROM, decreased cervical AROM, decreased thoracic rotation AROM, decreased L UE strength, decreased posture, decreased activity tolerance, and signifciant soft tissue reestrictions throughout cervical and shoulder complex    Visit Information:  Insurance: Payor: [employer] / Plan: [employer] / Product Type: *No Product type* /   PT Visit Information  Onset Date: 10/19/22  PT Insurance Information: [facility] claim # [id number]  Total # of Visits Approved: 8  Total # of Visits to Date: 13  Plan of Care/Certification Expiration Date: 23  No Show: 1  Progress Note Due Date: 23  Canceled Appointment: 3  Progress Note Counter:     Subjective Information:  Subjective: No pain currently, all ADL's are getting easier, but I still have some trouble reaching overhead.  HEP Compliance:  [x] Good [] Fair [] Poor [] Reports not doing due to:    Pain Screening  Patient Currently in Pain: Denies    Treatment:  Exercises:  Exercises  Exercise 1: UBE 3.0' / 3.0' L 3.0  Exercise 5: wall push ups x 10  Exercise 8: roll p-ball up wall x 10  Exercise 9: PNF patterns, D-1, D-2, flex/ext x 10, YTB  Exercise 11: ER stretch 20 sec x 3, seated  Exercise 13: Wall slides w/ lift off x 10 and wall slide stretch 20 sec x 3, flex, abd  Exercise 18: sleeper stretch 20 sec x 3       *Indicates exercise, modality, or manual techniques to be initiated when appropriate    Objective Measures:       Assessment:  Body Structures, Functions, Activity Limitations Requiring Skilled Therapeutic Intervention: Decreased posture, Increased pain, Decreased ADL status, Decreased ROM, Decreased strength, Decreased tolerance to work activity  Assessment: continued to progress strengthening and mobility exercises, to patient tolerance, added, ER stretch, seated, PNF patterns, D-1, D-2 flex/ext w/ YTB, p-ball roll up wall and wall push ups, vc's to keep all in comfort range, patient w/ good tolerance and technique w/ all. Treatment Diagnosis: L shoulder and neck pain,decreased L shoulder AROM, decreased cervical AROM, decreased thoracic rotation AROM, decreased L UE strength, decreased posture, decreased activity tolerance, and signifciant soft tissue reestrictions throughout cervical and shoulder complex  Therapy Prognosis: Good       Patient Education: [x] NA       Post-Pain Assessment:       Pain Rating (0-10 pain scale):   0/10   Location and pain description same as pre-treatment unless indicated.    Action: [] NA   [x] Perform HEP  [] Meds as prescribed  [] Modalities as prescribed   [] Call Physician     GOALS   Patient Goal(s): Patient Goals : \"Use my shoulder\"    Short Term Goals Completed by 2-3 weeks Goal Status   STG 1 The pt will demonstrate improved postural awareness requiring <25% VC's with exercises In progress, Partially met   STG 2 The pt will have decreased L UE pain </= 3/10 at worst in order to increase ease with ADL's In progress, Partially met       Long Term Goals Completed by 4-6 weeks Goal Status   LTG 1 The pt will be indep/compliant with HEP in order to self-manage symptoms upon D/C In progress, Partially met   LTG 2 The pt will have an increase in UEFI score >/=70/80 and decrease in NDI score to 0/50 points in order to increase functional activity tolerance In progress, Partially met   LTG 3 The pt will demonstrate improved L UE strength >/=4+/5 in order to lift/carry up to 50# for work related duties at [employer] In progress, Partially met   LTG 4 The pt will demo improved L shoulder and cervical AROM WNL's and B thoracic rotation >/=75% in order to increase ease with UE ADL's In progress, Partially met            Plan:  Frequency/Duration:  Plan  Plan Frequency: 2  Plan weeks: 3-4 weeks requested  Current Treatment Recommendations: Strengthening, ROM, Neuromuscular re-education, Manual, Pain management, Return to work related activity, Home exercise program, Patient/Caregiver education & training, Modalities, Positioning, Dry needling  Modalities: Heat/Cold, Mechanical Traction, Ultrasound, E-stim - unattended, E-stim - manual  Pt to continue current HEP. See objective section for any therapeutic exercise changes, additions or modifications this date.     Therapy Time:      PT Individual Minutes  Time In: 1300  Time Out: 0744  Minutes: 40  Timed Code Treatment Minutes: 40 Minutes  Procedure Minutes:0  Modality Time In Time Out Total Minutes Units   Ther ex ([id number]) [id number]       Electronically signed by Sotero Reyes PTA on 1/31/23 at 1:05 PM EST

## 2023-02-02 ENCOUNTER — HOSPITAL ENCOUNTER (OUTPATIENT)
Dept: PHYSICAL THERAPY | Age: 53
Setting detail: THERAPIES SERIES
Discharge: HOME | End: 2023-02-02
Payer: COMMERCIAL

## 2023-02-02 PROCEDURE — 97110 THERAPEUTIC EXERCISES: CPT

## 2023-02-02 NOTE — PROGRESS NOTES
[address] [facility]  Outpatient Physical Therapy    Treatment Note        Date: 2023  Patient: Hortensia Everett  : 1970   Confirmed: Yes  MRN: [id number]  Referring Provider: Chrissie Barreto MD    Medical Diagnosis: Strain of unspecified muscle, fascia and tendon at shoulder and upper arm level, left arm, initial encounter [S46.912A]  Strain of muscle, fascia and tendon at neck level, initial encounter [S16. 1XXA]       Treatment Diagnosis: L shoulder and neck pain,decreased L shoulder AROM, decreased cervical AROM, decreased thoracic rotation AROM, decreased L UE strength, decreased posture, decreased activity tolerance, and signifciant soft tissue reestrictions throughout cervical and shoulder complex    Visit Information:  Insurance: Payor: [employer] / Plan: [employer] / Product Type: *No Product type* /   PT Visit Information  Onset Date: 10/19/22  PT Insurance Information: [employer] claim # [id number]  Total # of Visits Approved: 8  Total # of Visits to Date: 13  Plan of Care/Certification Expiration Date: 23  No Show: 1  Progress Note Due Date: 23  Canceled Appointment: 3  Progress Note Counter:     Subjective Information:  Subjective: Patient reports no pain this date and felt good after recent visit. HEP Compliance:  [x] Good [] Fair [] Poor [] Reports not doing due to:    Pain Screening  Patient Currently in Pain: Denies    Treatment:  Exercises:  Exercises  Exercise 1: UBE 3.0' / 3.0' L 3.0  Exercise 8: roll p-ball up wall with extension focus x 10  Exercise 9: PNF patterns, D-1, D-2, flex/ext x 10, YTB  Exercise 10: L Pec stretch 3 x 20s  Exercise 11: ER stretch 20 sec x 3, seated  Exercise 12: Seated ER at table (elbow supported on table) 2 x 10 on L  Exercise 13: Wall slides w/ lift off x 10  Exercise 14: ball stabs at wall 4-way x10  Exercise 15: T-band Rows, Lat pulls, ADduciton- RTB / IR,ER(YTB) x 10 ea. Exercise 16: Supine Horz.  Chest pulls RTB w/ IR & ER RTB x 15 ea. Exercise 18: sleeper stretch 20 sec x 4  Exercise 20: HEP:  cont current     Objective Measures:         LTG 4 Current Status[de-identified] 2/2/23: Flex: 150 shoulder abd 120 deg, standing       Assessment: Body Structures, Functions, Activity Limitations Requiring Skilled Therapeutic Intervention: Decreased posture, Increased pain, Decreased ADL status, Decreased ROM, Decreased strength, Decreased tolerance to work activity  Assessment: Patient progressed through stabilization, strengthening, and mobility exercises this date to improve L shoulder function. Patient demonstrates good tolerance with all exercises, including additional ER mobilization and ROM exercises and displaying no increased pain. Improvements made when abduction was measured this date. Able to improve from 95 deg to 120 deg. Treatment Diagnosis: L shoulder and neck pain,decreased L shoulder AROM, decreased cervical AROM, decreased thoracic rotation AROM, decreased L UE strength, decreased posture, decreased activity tolerance, and signifciant soft tissue reestrictions throughout cervical and shoulder complex  Therapy Prognosis: Good         Post-Pain Assessment:       Pain Rating (0-10 pain scale):  0 /10   Location and pain description same as pre-treatment unless indicated.    Action: [] NA   [x] Perform HEP  [] Meds as prescribed  [] Modalities as prescribed   [] Call Physician     GOALS   Patient Goal(s): Patient Goals : \"Use my shoulder\"    Short Term Goals Completed by 2-3 weeks Goal Status   STG 1 The pt will demonstrate improved postural awareness requiring <25% VC's with exercises In progress, Partially met   STG 2 The pt will have decreased L UE pain </= 3/10 at worst in order to increase ease with ADL's In progress, Partially met     Long Term Goals Completed by 4-6 weeks Goal Status   LTG 1 The pt will be indep/compliant with HEP in order to self-manage symptoms upon D/C In progress, Partially met   LTG 2 The pt will have an increase in UEFI score >/=70/80 and decrease in NDI score to 0/50 points in order to increase functional activity tolerance In progress, Partially met   LTG 3 The pt will demonstrate improved L UE strength >/=4+/5 in order to lift/carry up to 50# for work related duties at [employer] In progress, Partially met   LTG 4 The pt will demo improved L shoulder and cervical AROM WNL's and B thoracic rotation >/=75% in order to increase ease with UE ADL's In progress, Partially met   Plan:  Frequency/Duration:  Plan  Plan Frequency: 2  Plan weeks: 3-4 weeks requested  Current Treatment Recommendations: Strengthening, ROM, Neuromuscular re-education, Manual, Pain management, Return to work related activity, Home exercise program, Patient/Caregiver education & training, Modalities, Positioning, Dry needling  Modalities: Heat/Cold, Mechanical Traction, Ultrasound, E-stim - unattended, E-stim - manual  Pt to continue current HEP. See objective section for any therapeutic exercise changes, additions or modifications this date.     Therapy Time:      PT Individual Minutes  Time In: 0909  Time Out: 6922  Minutes: 47  Timed Code Treatment Minutes: 47 Minutes  Procedure Minutes: 0    Modality Time In Time Out Total Minutes Units    Ther ex ([id number]) [id number]   Electronically signed by Jacky Nielsen PTA on 2/2/23 at 4:14 PM EST

## 2023-02-03 ENCOUNTER — APPOINTMENT (OUTPATIENT)
Dept: PHYSICAL THERAPY | Age: 53
End: 2023-02-03
Payer: COMMERCIAL

## 2023-02-07 ENCOUNTER — HOSPITAL ENCOUNTER (OUTPATIENT)
Dept: PHYSICAL THERAPY | Age: 53
Setting detail: THERAPIES SERIES
Discharge: HOME | End: 2023-02-07
Payer: COMMERCIAL

## 2023-02-07 PROCEDURE — 97110 THERAPEUTIC EXERCISES: CPT

## 2023-02-07 NOTE — PROGRESS NOTES
[facility] [facility]  Outpatient Physical Therapy    Treatment Note        Date: 2023  Patient: Carie Barreto  : 1970   Confirmed: Yes  MRN: [id number]  Referring Provider: Beto Cervantes MD    Medical Diagnosis: Strain of unspecified muscle, fascia and tendon at shoulder and upper arm level, left arm, initial encounter [S46.912A]  Strain of muscle, fascia and tendon at neck level, initial encounter [S16. 1XXA]       Treatment Diagnosis: L shoulder and neck pain,decreased L shoulder AROM, decreased cervical AROM, decreased thoracic rotation AROM, decreased L UE strength, decreased posture, decreased activity tolerance, and signifciant soft tissue reestrictions throughout cervical and shoulder complex    Visit Information:  Insurance: Payor: [employer] / Plan: [employer] / Product Type: *No Product type* /   PT Visit Information  Onset Date: 10/19/22  PT Insurance Information: [facility] claim # [id number]  Total # of Visits Approved: 8  Total # of Visits to Date: 14  Plan of Care/Certification Expiration Date: 23  No Show: 1  Progress Note Due Date: 23  Canceled Appointment: 3  Progress Note Counter: 3/8    Subjective Information:  Subjective: No pain currently, I feel about 75% functional.  HEP Compliance:  [x] Good [] Fair [] Poor [] Reports not doing due to:         Treatment:  Exercises:  Exercises  Exercise 1: UBE 3.5, F/R, 6 min total  Exercise 2: IR stretch 20 sec x 5 w/ towel  Exercise 3: sidelying shoulder ABD, 5 sec x 10 w/ 1# wt  Exercise 5: wall push ups x 10  Exercise 6: look away stretch, post cap stretch and bicep stretch, 20 sec x 3, LUE  Exercise 13: Wall slides, 10 sec x 10, flex/abd and wall slides w/ lift x 10  Exercise 15: Apollo, rows/lats x 10 w/ 1 plate       Manual:           Modalities:          *Indicates exercise, modality, or manual techniques to be initiated when appropriate    Objective Measures: LTG 3 Current Status[de-identified] 2-7-2023: left shoulder, flexion 4+/5, abd 4/5                Assessment: Body Structures, Functions, Activity Limitations Requiring Skilled Therapeutic Intervention: Decreased posture, Increased pain, Decreased ADL status, Decreased ROM, Decreased strength, Decreased tolerance to work activity  Assessment: Continued to progress stretches and strengthening exercises to patient's tolerance, increased UBE, added IR stretch w/ towel, look away stretch, post cap stretch, bicep stretch, S/L abd 5 sec hold w/ 1# wt, patient continues to have difficulty w/ shoulder abd, overall good tolerance to session. Treatment Diagnosis: L shoulder and neck pain,decreased L shoulder AROM, decreased cervical AROM, decreased thoracic rotation AROM, decreased L UE strength, decreased posture, decreased activity tolerance, and signifciant soft tissue reestrictions throughout cervical and shoulder complex  Therapy Prognosis: Good       Patient Education: [x] NA       Post-Pain Assessment:       Pain Rating (0-10 pain scale):   \"good\"/10   Location and pain description same as pre-treatment unless indicated.    Action: [] NA   [x] Perform HEP  [] Meds as prescribed  [] Modalities as prescribed   [] Call Physician     GOALS   Patient Goal(s): Patient Goals : \"Use my shoulder\"    Short Term Goals Completed by 2-3 weeks Goal Status   STG 1 The pt will demonstrate improved postural awareness requiring <25% VC's with exercises In progress, Partially met   STG 2 The pt will have decreased L UE pain </= 3/10 at worst in order to increase ease with ADL's In progress, Partially met       Long Term Goals Completed by 4-6 weeks Goal Status   LTG 1 The pt will be indep/compliant with HEP in order to self-manage symptoms upon D/C In progress, Partially met   LTG 2 The pt will have an increase in UEFI score >/=70/80 and decrease in NDI score to 0/50 points in order to increase functional activity tolerance In progress, Partially met   LTG 3 The pt will demonstrate improved L UE strength >/=4+/5 in order to lift/carry up to 50# for work related duties at [employer] In progress, Partially met   LTG 4 The pt will demo improved L shoulder and cervical AROM WNL's and B thoracic rotation >/=75% in order to increase ease with UE ADL's In progress, Partially met            Plan:  Frequency/Duration:  Plan  Plan Frequency: 2  Plan weeks: 3-4 weeks requested  Current Treatment Recommendations: Strengthening, ROM, Neuromuscular re-education, Manual, Pain management, Return to work related activity, Home exercise program, Patient/Caregiver education & training, Modalities, Positioning, Dry needling  Modalities: Heat/Cold, Mechanical Traction, Ultrasound, E-stim - unattended, E-stim - manual  Pt to continue current HEP. See objective section for any therapeutic exercise changes, additions or modifications this date.     Therapy Time:      PT Individual Minutes  Time In: 1300  Time Out: 3434  Minutes: 40  Timed Code Treatment Minutes: 40 Minutes  Procedure Minutes:0  Modality Time In Time Out Total Minutes Units   Ther ex ([id number]) [phone]       Electronically signed by Ruben Wood PTA on 2/7/23 at 1:04 PM EST

## 2023-02-09 ENCOUNTER — HOSPITAL ENCOUNTER (OUTPATIENT)
Dept: PHYSICAL THERAPY | Age: 53
Setting detail: THERAPIES SERIES
Discharge: HOME | End: 2023-02-09
Payer: COMMERCIAL

## 2023-02-09 PROCEDURE — 97110 THERAPEUTIC EXERCISES: CPT

## 2023-02-09 NOTE — PROGRESS NOTES
[facility]  Outpatient Physical Therapy    Treatment Note        Date: 2023  Patient: Riddhi Azevedo  : 1970   Confirmed: Yes  MRN: [id number]  Referring Provider: Junior Mohan MD    Medical Diagnosis: Strain of unspecified muscle, fascia and tendon at shoulder and upper arm level, left arm, initial encounter [S46.912A]  Strain of muscle, fascia and tendon at neck level, initial encounter [S16. 1XXA]       Treatment Diagnosis: L shoulder and neck pain,decreased L shoulder AROM, decreased cervical AROM, decreased thoracic rotation AROM, decreased L UE strength, decreased posture, decreased activity tolerance, and signifciant soft tissue reestrictions throughout cervical and shoulder complex    Visit Information:  Insurance: Payor: [employer] / Plan: [employer] / Product Type: *No Product type* /   PT Visit Information  Onset Date: 10/19/22  PT Insurance Information: [facility] claim # [id number]  Total # of Visits Approved: 8  Total # of Visits to Date: 16  Plan of Care/Certification Expiration Date: 23  No Show: 1  Progress Note Due Date: 23  Canceled Appointment: 3  Progress Note Counter:     Subjective Information:  Subjective: No pain currently, all ADL's are easier  HEP Compliance:  [x] Good [] Fair [] Poor [] Reports not doing due to:    Pain Screening  Patient Currently in Pain: Denies    Treatment:  Exercises:  Exercises  Exercise 1: UBE 3.5, F/R, 6 min total  Exercise 5: wall slides flex,10 sec x 10, abd 10 sec x 10 at mat  Exercise 6: look away stretch 20 sec x3, w/ towel  Exercise 8: roll p-ball up wall with extension focus 10 sec  x 10  Exercise 20: manual PROM 12 min       Manual:           Modalities:          *Indicates exercise, modality, or manual techniques to be initiated when appropriate    Objective Measures:                                                           LTG 4 Current Status[de-identified] 23: left shoulder flexion 150, abd 122 deg, standing             Assessment: Body Structures, Functions, Activity Limitations Requiring Skilled Therapeutic Intervention: Decreased posture, Increased pain, Decreased ADL status, Decreased ROM, Decreased strength, Decreased tolerance to work activity  Assessment: Continued to progress stretches and strengthening exercises to patient's tolerance, added, 10 sec hold w/ ball roll up ex, patient continues to feel a catch w/ abd motion, good overall tolerance to session. Treatment Diagnosis: L shoulder and neck pain,decreased L shoulder AROM, decreased cervical AROM, decreased thoracic rotation AROM, decreased L UE strength, decreased posture, decreased activity tolerance, and signifciant soft tissue reestrictions throughout cervical and shoulder complex  Therapy Prognosis: Good       Patient Education: [x] NA       Post-Pain Assessment:       Pain Rating (0-10 pain scale):   0/10   Location and pain description same as pre-treatment unless indicated.    Action: [] NA   [x] Perform HEP  [] Meds as prescribed  [] Modalities as prescribed   [] Call Physician     GOALS   Patient Goal(s): Patient Goals : \"Use my shoulder\"    Short Term Goals Completed by 2-3 weeks Goal Status   STG 1 The pt will demonstrate improved postural awareness requiring <25% VC's with exercises In progress, Partially met   STG 2 The pt will have decreased L UE pain </= 3/10 at worst in order to increase ease with ADL's In progress, Partially met       Long Term Goals Completed by 4-6 weeks Goal Status   LTG 1 The pt will be indep/compliant with HEP in order to self-manage symptoms upon D/C In progress, Partially met   LTG 2 The pt will have an increase in UEFI score >/=70/80 and decrease in NDI score to 0/50 points in order to increase functional activity tolerance In progress, Partially met   LTG 3 The pt will demonstrate improved L UE strength >/=4+/5 in order to lift/carry up to 50# for work related duties at [employer] In progress, Partially met   LTG 4 The pt will demo improved L shoulder and cervical AROM WNL's and B thoracic rotation >/=75% in order to increase ease with UE ADL's In progress, Partially met            Plan:  Frequency/Duration:  Plan  Plan Frequency: 2  Plan weeks: 3-4 weeks requested  Current Treatment Recommendations: Strengthening, ROM, Neuromuscular re-education, Manual, Pain management, Return to work related activity, Home exercise program, Patient/Caregiver education & training, Modalities, Positioning, Dry needling  Modalities: Heat/Cold, Mechanical Traction, Ultrasound, E-stim - unattended, E-stim - manual  Pt to continue current HEP. See objective section for any therapeutic exercise changes, additions or modifications this date.     Therapy Time:      PT Individual Minutes  Time In: 1430  Time Out: 9249  Minutes: 38  Timed Code Treatment Minutes: 38 Minutes  Procedure Minutes:0  Modality Time In Time Out Total Minutes Units   Ther ex ([id number]) [id number]      Electronically signed by Truong Haley PTA on 2/9/23 at 2:34 PM EST

## 2023-02-13 ENCOUNTER — HOSPITAL ENCOUNTER (OUTPATIENT)
Dept: PHYSICAL THERAPY | Age: 53
Setting detail: THERAPIES SERIES
End: 2023-02-13
Payer: COMMERCIAL

## 2023-02-13 NOTE — PROGRESS NOTES
Therapy                            Cancellation/No-show Note      Date: 2023  Patient: Carlos Jimenez (53 y.o. female)  : 1970  MRN:  [id number]  Referring Physician: Kerry Serrato MD    Medical Diagnosis: Strain of unspecified muscle, fascia and tendon at shoulder and upper arm level, left arm, initial encounter [S46.912A]  Strain of muscle, fascia and tendon at neck level, initial encounter [S16. 1XXA]      Visit Information:  Visits to Date 16   No Show/Cancelled Appts:       For today's appointment patient:  [x]  Cancelled  []  Rescheduled appointment  []  No-show   []  Called pt to remind of next appointment     Reason given by patient:  [x]  Patient ill  []  Conflicting appointment  []  No transportation    []  Conflict with work  []  No reason given  []  Other:      [x] Pt has future appointments scheduled, no follow up needed  [] Pt requests to be on hold.     Reason:   If > 2 weeks please discuss with therapist.  [] Therapist to call pt for follow up     Comments:       Signature: Electronically signed by Chacorta Reinoso PTA on 23 at 11:44 AM EST

## 2023-02-14 ENCOUNTER — APPOINTMENT (OUTPATIENT)
Dept: PHYSICAL THERAPY | Age: 53
End: 2023-02-14
Payer: COMMERCIAL

## 2023-02-16 ENCOUNTER — HOSPITAL ENCOUNTER (OUTPATIENT)
Dept: PHYSICAL THERAPY | Age: 53
Setting detail: THERAPIES SERIES
Discharge: HOME | End: 2023-02-16
Payer: COMMERCIAL

## 2023-02-16 PROCEDURE — 97110 THERAPEUTIC EXERCISES: CPT

## 2023-02-16 NOTE — PROGRESS NOTES
[facility]  Outpatient Physical Therapy    Treatment Note        Date: 2023  Patient: Dot Rainey  : 1970   Confirmed: Yes  MRN: [id number]  Referring Provider: Eligio Joy MD    Medical Diagnosis: Strain of unspecified muscle, fascia and tendon at shoulder and upper arm level, left arm, initial encounter [S46.912A]  Strain of muscle, fascia and tendon at neck level, initial encounter [S16. 1XXA]       Treatment Diagnosis: L shoulder and neck pain,decreased L shoulder AROM, decreased cervical AROM, decreased thoracic rotation AROM, decreased L UE strength, decreased posture, decreased activity tolerance, and signifciant soft tissue reestrictions throughout cervical and shoulder complex    Visit Information:  Insurance: Payor: [employer] / Plan: [employer] / Product Type: *No Product type* /   PT Visit Information  Onset Date: 10/19/22  PT Insurance Information: [facility] claim # [id number]  Total # of Visits Approved: 8  Total # of Visits to Date:   Plan of Care/Certification Expiration Date: 23  No Show: 1  Progress Note Due Date: 23  Canceled Appointment: 4  Progress Note Counter:     Subjective Information:  Subjective: No pain currently, all ADL's are easier, I still get a catching sensation when reaching out to the side.   HEP Compliance:  [x] Good [] Fair [] Poor [] Reports not doing due to:    Pain Screening  Patient Currently in Pain: Denies    Treatment:  Exercises:  Exercises  Exercise 1: UBE 3.8, F/R, 5 min total  Exercise 2: IR stretch, corner stretch, scap retractions, 20 sec x 5  Exercise 3: sidelying shoulder ABD, 5 sec x 15  Exercise 5: wall push ups x 20  Exercise 6: bicep stretch 20 sec x 5, at tower  Exercise 10: pendulums, 4-way x 20  Exercise 12: S/L ER x 15  Exercise 14: IR x 15 w/ RTB  Exercise 15: Apollo, rows/lats x 10 w/ 1 plate  Exercise 17: post cap stretch 20 sec x 5, LUE  Exercise 18: sleeper stretch 20 sec x 5 Manual:           Modalities:          *Indicates exercise, modality, or manual techniques to be initiated when appropriate    Objective Measures:                                                        LTG 3 Current Status[de-identified] 2-: abd 4-/5, IR 5/5, ER 4+/5                Assessment: Body Structures, Functions, Activity Limitations Requiring Skilled Therapeutic Intervention: Decreased posture, Increased pain, Decreased ADL status, Decreased ROM, Decreased strength, Decreased tolerance to work activity  Assessment: Continued to progress stretches and strengthening exercises to patient's tolerance, focused mostly on stretching this date as well as limited strengthening exwercises, patient w/ good tolerance to session, continues to feeel a slight catch starting at 90 deg abd, limiting ROM to no more than 120 deg. Treatment Diagnosis: L shoulder and neck pain,decreased L shoulder AROM, decreased cervical AROM, decreased thoracic rotation AROM, decreased L UE strength, decreased posture, decreased activity tolerance, and signifciant soft tissue reestrictions throughout cervical and shoulder complex  Therapy Prognosis: Good       Patient Education: [x] NA       Post-Pain Assessment:       Pain Rating (0-10 pain scale):  0 /10   Location and pain description same as pre-treatment unless indicated.    Action: [] NA   [x] Perform HEP  [] Meds as prescribed  [] Modalities as prescribed   [] Call Physician     GOALS   Patient Goal(s): Patient Goals : \"Use my shoulder\"    Short Term Goals Completed by 2-3 weeks Goal Status   STG 1 The pt will demonstrate improved postural awareness requiring <25% VC's with exercises In progress, Partially met   STG 2 The pt will have decreased L UE pain </= 3/10 at worst in order to increase ease with ADL's In progress, Partially met       Long Term Goals Completed by 4-6 weeks Goal Status   LTG 1 The pt will be indep/compliant with HEP in order to self-manage symptoms upon D/C In progress, Partially met   LTG 2 The pt will have an increase in UEFI score >/=70/80 and decrease in NDI score to 0/50 points in order to increase functional activity tolerance In progress, Partially met   LTG 3 The pt will demonstrate improved L UE strength >/=4+/5 in order to lift/carry up to 50# for work related duties at [employer] In progress, Partially met   LTG 4 The pt will demo improved L shoulder and cervical AROM WNL's and B thoracic rotation >/=75% in order to increase ease with UE ADL's In progress, Partially met            Plan:  Frequency/Duration:  Plan  Plan Frequency: 2  Plan weeks: 3-4 weeks requested  Current Treatment Recommendations: Strengthening, ROM, Neuromuscular re-education, Manual, Pain management, Return to work related activity, Home exercise program, Patient/Caregiver education & training, Modalities, Positioning, Dry needling  Modalities: Heat/Cold, Mechanical Traction, Ultrasound, E-stim - unattended, E-stim - manual  Pt to continue current HEP. See objective section for any therapeutic exercise changes, additions or modifications this date.     Therapy Time:      PT Individual Minutes  Time In: 1300  Time Out: 4324  Minutes: 38  Timed Code Treatment Minutes: 38 Minutes  Procedure Minutes:0    Modality Time In Time Out Total Minutes Units    Ther ex ([id number]) [id number] [phone]     Electronically signed by Ken Abarca PTA on 2/16/23 at 1:05 PM EST

## 2023-02-20 ENCOUNTER — HOSPITAL ENCOUNTER (OUTPATIENT)
Dept: PHYSICAL THERAPY | Age: 53
Setting detail: THERAPIES SERIES
Discharge: HOME | End: 2023-02-20
Payer: COMMERCIAL

## 2023-02-20 NOTE — PROGRESS NOTES
Therapy                            Cancellation/No-show Note      Date: 2023  Patient: Jason Guerrier (49 y.o. female)  : 1970  MRN:  [id number]  Referring Physician: Freddy Leone MD    Medical Diagnosis: Strain of unspecified muscle, fascia and tendon at shoulder and upper arm level, left arm, initial encounter [S46.912A]  Strain of muscle, fascia and tendon at neck level, initial encounter [S16. 1XXA]      Visit Information:  Visits to Date 17   No Show/Cancelled Appts:       For today's appointment patient:  [x]  Cancelled  []  Rescheduled appointment  []  No-show   []  Called pt to remind of next appointment     Reason given by patient:  [x]  Patient ill  []  Conflicting appointment  []  No transportation    []  Conflict with work  []  No reason given  []  Other:      [x] Pt has future appointments scheduled, no follow up needed  [] Pt requests to be on hold.     Reason:   If > 2 weeks please discuss with therapist.  [] Therapist to call pt for follow up     Comments:       Signature: Electronically signed by Severiano Spark, PTA on 23 at 2:07 PM EST

## 2023-02-21 ENCOUNTER — HOSPITAL ENCOUNTER (OUTPATIENT)
Dept: PHYSICAL THERAPY | Age: 53
Setting detail: THERAPIES SERIES
Discharge: HOME | End: 2023-02-21
Payer: COMMERCIAL

## 2023-02-21 NOTE — PROGRESS NOTES
Therapy                            Cancellation/No-show Note      Date: 2023  Patient: Hortensia Everett (60 y.o. female)  : 1970  MRN:  [id number]  Referring Physician: Chrissie Barreto MD    Medical Diagnosis: Strain of unspecified muscle, fascia and tendon at shoulder and upper arm level, left arm, initial encounter [S46.912A]  Strain of muscle, fascia and tendon at neck level, initial encounter [S16. 1XXA]      Visit Information:  Visits to Date 17   No Show/Cancelled Appts:       For today's appointment patient:  [x]  Cancelled  []  Rescheduled appointment  []  No-show   []  Called pt to remind of next appointment     Reason given by patient:  [x]  Patient ill  []  Conflicting appointment  []  No transportation    []  Conflict with work  []  No reason given  []  Other:      [x] Pt has future appointments scheduled, no follow up needed  [] Pt requests to be on hold.     Reason:   If > 2 weeks please discuss with therapist.  [] Therapist to call pt for follow up     Comments:   patient arrived for appt, looking ill, advised patient to cx appt and return for  appt    Signature: Electronically signed by Apolonia Zimmer PTA on 23 at 1:03 PM EST

## 2023-02-23 ENCOUNTER — HOSPITAL ENCOUNTER (OUTPATIENT)
Dept: PHYSICAL THERAPY | Age: 53
Setting detail: THERAPIES SERIES
Discharge: HOME | End: 2023-02-23
Payer: COMMERCIAL

## 2023-02-23 NOTE — PROGRESS NOTES
Therapy                            Cancellation/No-show Note      Date: 2023  Patient: Jason Guerrier (89 y.o. female)  : 1970  MRN:  [id number]  Referring Physician: Freddy Leone MD    Medical Diagnosis: Strain of unspecified muscle, fascia and tendon at shoulder and upper arm level, left arm, initial encounter [S46.912A]  Strain of muscle, fascia and tendon at neck level, initial encounter [S16. 1XXA]      Visit Information:  Visits to Date 17   No Show/Cancelled Appts:       For today's appointment patient:  [x]  Cancelled  []  Rescheduled appointment  []  No-show   []  Called pt to remind of next appointment     Reason given by patient:  [x]  Patient ill  []  Conflicting appointment  []  No transportation    []  Conflict with work  []  No reason given  []  Other:      [] Pt has future appointments scheduled, no follow up needed  [] Pt requests to be on hold.     Reason:   If > 2 weeks please discuss with therapist.  [x] Therapist to call pt for follow up     Comments:       Signature: Electronically signed by Momo Christianson PT on 23 at 2:47 PM EST

## 2023-03-08 ENCOUNTER — HOSPITAL ENCOUNTER (OUTPATIENT)
Dept: PHYSICAL THERAPY | Age: 53
Setting detail: THERAPIES SERIES
Discharge: HOME | End: 2023-03-08
Payer: COMMERCIAL

## 2023-03-08 PROCEDURE — 97110 THERAPEUTIC EXERCISES: CPT

## 2023-03-08 NOTE — PROGRESS NOTES
[facility]  Outpatient Physical Therapy    Treatment Note        Date: 3/8/2023  Patient: Kaitlynn Edwards  : 1970   Confirmed: Yes  MRN: [id number]  Referring Provider: Mariam Cortés MD    Medical Diagnosis: Strain of unspecified muscle, fascia and tendon at shoulder and upper arm level, left arm, initial encounter [S46.912A]  Strain of muscle, fascia and tendon at neck level, initial encounter [S16. 1XXA]       Treatment Diagnosis: L shoulder and neck pain,decreased L shoulder AROM, decreased cervical AROM, decreased thoracic rotation AROM, decreased L UE strength, decreased posture, decreased activity tolerance, and signifciant soft tissue reestrictions throughout cervical and shoulder complex    Visit Information:  Insurance: Payor: [employer] / Plan: [employer] / Product Type: *No Product type* /   PT Visit Information  Onset Date: 10/19/22  PT Insurance Information: [id number] claim # [id number]  Total # of Visits Approved: 8  Total # of Visits to Date: 18  Plan of Care/Certification Expiration Date: 23  No Show: 1  Progress Note Due Date: 23  Canceled Appointment: 7  Progress Note Counter:     Subjective Information:  Subjective: Pt reports functioning at ~80% currently with most difficulty w/ abduction and IR AROM. Pt reports she's been using her arm a lot more at work w/ improvements. Pt reports she's noticed increased  \"popping\" on the R side of her neck lately, though denies increased pain associated with it.   HEP Compliance:  [x] Good [] Fair [] Poor [] Reports not doing due to:    Pain Screening  Patient Currently in Pain: Denies    Treatment:  Exercises:  Exercises  Exercise 1: UBE 4.0, F/R, 5 min total  Exercise 2: apollo rows/lats 30# 2x10 ea  Exercise 3: apollo IR/ER 20# 2x10 ea  Exercise 4: wall push ups x20  Exercise 5: L UE prone rows 3# x15, lats 3# x15, horizontal abd IR/ER 2# x15ea, LT x15  Exercise 6: ball stabs at wall 4-way x10 ea  Exercise 7: wall slides flex, abd 5''x10  Treatment Reasoning  Limitations addressed: Mobility, Flexibility    *Indicates exercise, modality, or manual techniques to be initiated when appropriate    Objective Measures:   STG 1 Current Status[de-identified] 3/8/23 pt requires <25% VC's for posture  STG 2 Current Status[de-identified] 3/8/23 pt denies pain in the last week  LTG 4 Current Status[de-identified] 3/8/23 L shoulder flex 150, abd 122, ER T2, IR L4 standing; B thoracic rotation 75%      Assessment: Body Structures, Functions, Activity Limitations Requiring Skilled Therapeutic Intervention: Decreased posture, Increased pain, Decreased ADL status, Decreased ROM, Decreased strength, Decreased tolerance to work activity  Assessment: Pt w/ almost three week lapse in treatment d/t illness and awaiting for date ext. Pt denies any pain recently in L shoulder, however cont's to have limitations w/ L abd and IR AROM w/ difficulty fastening bra and reaching for objects to the side. Progressed strengthening ex's w/o c/o inc pain. Treatment Diagnosis: L shoulder and neck pain,decreased L shoulder AROM, decreased cervical AROM, decreased thoracic rotation AROM, decreased L UE strength, decreased posture, decreased activity tolerance, and signifciant soft tissue reestrictions throughout cervical and shoulder complex  Therapy Prognosis: Good       Patient Education: [] NA  PT Education: Goals, PT Role, Plan of Care, Home Exercise Program    Post-Pain Assessment:       Pain Rating (0-10 pain scale):  0 /10   Location and pain description same as pre-treatment unless indicated.    Action: [] NA   [x] Perform HEP  [] Meds as prescribed  [] Modalities as prescribed   [] Call Physician     GOALS   Patient Goal(s): Patient Goals : \"Use my shoulder\"    Short Term Goals Completed by 2-3 weeks Goal Status   STG 1 The pt will demonstrate improved postural awareness requiring <25% VC's with exercises Met   STG 2 The pt will have decreased L UE pain </= 3/10 at worst in order to increase ease with ADL's Met     Long Term Goals Completed by 4-6 weeks Goal Status   LTG 1 The pt will be indep/compliant with HEP in order to self-manage symptoms upon D/C In progress, Partially met   LTG 2 The pt will have an increase in UEFI score >/=70/80 and decrease in NDI score to 0/50 points in order to increase functional activity tolerance In progress, Partially met   LTG 3 The pt will demonstrate improved L UE strength >/=4+/5 in order to lift/carry up to 50# for work related duties at [employer] In progress, Partially met   LTG 4 The pt will demo improved L shoulder and cervical AROM WNL's and B thoracic rotation >/=75% in order to increase ease with UE ADL's In progress, Partially met          Plan:  Frequency/Duration:  Plan  Plan Frequency: 2  Plan weeks: 3-4 weeks requested  Specific Instructions for Next Treatment: anticipate D/C at end of current POC  Current Treatment Recommendations: Strengthening, ROM, Neuromuscular re-education, Manual, Pain management, Return to work related activity, Home exercise program, Patient/Caregiver education & training, Modalities, Positioning, Dry needling  Modalities: Heat/Cold, Mechanical Traction, Ultrasound, E-stim - unattended, E-stim - manual  Pt to continue current HEP. See objective section for any therapeutic exercise changes, additions or modifications this date.     Therapy Time:      PT Individual Minutes  Time In: 9071  Time Out: 1200  Minutes: 39  Timed Code Treatment Minutes: 39 Minutes  Procedure Minutes:0    Modality Time In Time Out Total Minutes Units    Ther ex ([id number]) [id number]     Electronically signed by Ciarra Collier PTA on 3/8/23 at 11:37 AM EST

## 2023-03-13 ENCOUNTER — HOSPITAL ENCOUNTER (OUTPATIENT)
Dept: PHYSICAL THERAPY | Age: 53
Setting detail: THERAPIES SERIES
Discharge: HOME | End: 2023-03-13
Payer: COMMERCIAL

## 2023-03-13 PROCEDURE — 97110 THERAPEUTIC EXERCISES: CPT

## 2023-03-13 NOTE — PROGRESS NOTES
[address] [facility]  Outpatient Physical Therapy    Treatment Note        Date: 3/13/2023  Patient: Abida Yoon  : 1970   Confirmed: Yes  MRN: [id number]  Referring Provider: Ramy Gutierrez MD    Medical Diagnosis: Strain of unspecified muscle, fascia and tendon at shoulder and upper arm level, left arm, initial encounter [S46.912A]  Strain of muscle, fascia and tendon at neck level, initial encounter [S16. 1XXA]       Treatment Diagnosis: L shoulder and neck pain,decreased L shoulder AROM, decreased cervical AROM, decreased thoracic rotation AROM, decreased L UE strength, decreased posture, decreased activity tolerance, and signifciant soft tissue reestrictions throughout cervical and shoulder complex    Visit Information:  Insurance: Payor: [employer] / Plan: [employer] / Product Type: *No Product type* /   PT Visit Information  Onset Date: 10/19/22  PT Insurance Information: [id number] [facility] claim # [id number]  Total # of Visits Approved: 8  Total # of Visits to Date:   Plan of Care/Certification Expiration Date: 23  No Show: 1  Progress Note Due Date: 23  Canceled Appointment: 7  Progress Note Counter:     Subjective Information:  Subjective: Pt reports she saw [facility] this AM and they requested ortho consult for cortisone injection and extended PT. HEP Compliance:  [x] Good [] Fair [] Poor [] Reports not doing due to:    Pain Screening  Patient Currently in Pain: Denies    Treatment:  Exercises:  Exercises  Exercise 1: UBE 4.5, F/R, 5 min total  Exercise 2: apollo rows/lats 30# 2x10 ea  Exercise 3: apollo IR/ER 20# 2x10 ea  Exercise 5: L UE prone rows 3# x15, lats 3# x15, horizontal abd IR/ER 2# x15ea, LT x15  Exercise 6: ball stabs at wall 4-way x10 ea 1kg ball  Exercise 7: wall slides flex, abd 5''x10  Exercise 8: number wall reach 0-9 w/ 1kg ball x3  Exercise 9: L IR stretch w/ strap 20''x5  Treatment Reasoning  Limitations addressed:  Mobility, Flexibility    *Indicates exercise, modality, or manual techniques to be initiated when appropriate    Objective Measures:     LTG 4 Current Status[de-identified] 3/13/23 L shoulder flex 150, abd 122, ER T2, IR L2 standing; B thoracic rotation 75%    Assessment: Body Structures, Functions, Activity Limitations Requiring Skilled Therapeutic Intervention: Decreased posture, Increased pain, Decreased ADL status, Decreased ROM, Decreased strength, Decreased tolerance to work activity  Assessment: Cont to progress therex for increased strength and abd and IR AROM of L shoulder. Pt without significant c/o pain throughout session, however cont's to struggle w/ abd>IR AROM, especially after ~90 deg of abd. Treatment Diagnosis: L shoulder and neck pain,decreased L shoulder AROM, decreased cervical AROM, decreased thoracic rotation AROM, decreased L UE strength, decreased posture, decreased activity tolerance, and signifciant soft tissue reestrictions throughout cervical and shoulder complex  Therapy Prognosis: Good       Patient Education: [] NA  Patient Education: discussed current POC and progress towards goals; discussed will assess remaining goals NV w/ potential D/C d/t plateau    Post-Pain Assessment:       Pain Rating (0-10 pain scale):   0/10   Location and pain description same as pre-treatment unless indicated.    Action: [] NA   [x] Perform HEP  [] Meds as prescribed  [] Modalities as prescribed   [] Call Physician     GOALS   Patient Goal(s): Patient Goals : \"Use my shoulder\"    Short Term Goals Completed by 2-3 weeks Goal Status   STG 1 The pt will demonstrate improved postural awareness requiring <25% VC's with exercises Met   STG 2 The pt will have decreased L UE pain </= 3/10 at worst in order to increase ease with ADL's Met     Long Term Goals Completed by 4-6 weeks Goal Status   LTG 1 The pt will be indep/compliant with HEP in order to self-manage symptoms upon D/C In progress, Partially met   LTG 2 The pt will have an increase in UEFI score >/=70/80 and decrease in NDI score to 0/50 points in order to increase functional activity tolerance In progress, Partially met   LTG 3 The pt will demonstrate improved L UE strength >/=4+/5 in order to lift/carry up to 50# for work related duties at [email or web address] In progress, Partially met   LTG 4 The pt will demo improved L shoulder and cervical AROM WNL's and B thoracic rotation >/=75% in order to increase ease with UE ADL's In progress, Partially met          Plan:  Frequency/Duration:  Plan  Plan Frequency: 2  Plan weeks: 3-4 weeks requested  Specific Instructions for Next Treatment: anticipate D/C  Current Treatment Recommendations: Strengthening, ROM, Neuromuscular re-education, Manual, Pain management, Return to work related activity, Home exercise program, Patient/Caregiver education & training, Modalities, Positioning, Dry needling  Modalities: Heat/Cold, Mechanical Traction, Ultrasound, E-stim - unattended, E-stim - manual  Pt to continue current HEP. See objective section for any therapeutic exercise changes, additions or modifications this date.     Therapy Time:      PT Individual Minutes  Time In: 3007  Time Out: 1424  Minutes: 39  Timed Code Treatment Minutes: 39 Minutes  Procedure Minutes: 0    Modality Time In Time Out Total Minutes Units    Ther ex ([id number]) [id number] 3     Electronically signed by Lydia Hu PTA on 3/13/23 at 1:49 PM EDT

## 2023-03-15 ENCOUNTER — HOSPITAL ENCOUNTER (OUTPATIENT)
Dept: PHYSICAL THERAPY | Age: 53
Setting detail: THERAPIES SERIES
Discharge: HOME | End: 2023-03-15
Payer: COMMERCIAL

## 2023-03-15 PROCEDURE — 97140 MANUAL THERAPY 1/> REGIONS: CPT

## 2023-03-15 PROCEDURE — 97110 THERAPEUTIC EXERCISES: CPT

## 2023-03-15 NOTE — PROGRESS NOTES
[address]  PHYSICAL THERAPY PLAN OF CARE   [address], [address] [facility]         Ph: [phone]  Fax: [phone]    [] Certification  [] Recertification []  Plan of Care  [] Progress Note [x] Discharge      Referring Provider: Rafiq Johnson MD     From:  Dominik Prince PT, DPT  Patient: Kayley Mccauley (42 y.o. female) : 1970 Date: 3/15/2023  Medical Diagnosis: Strain of unspecified muscle, fascia and tendon at shoulder and upper arm level, left arm, initial encounter [A02.907F]  Strain of muscle, fascia and tendon at neck level, initial encounter [S16. 1XXA]       Treatment Diagnosis: L shoulder and neck pain,decreased L shoulder AROM, decreased cervical AROM, decreased thoracic rotation AROM, decreased L UE strength, decreased posture, decreased activity tolerance, and signifciant soft tissue reestrictions throughout cervical and shoulder complex    Plan of Care/Certification Expiration Date: 23   Progress Report Period from:  2023  to 3/15/2023    Visits to Date: 20 No Show: 1 Cancelled Appts: 7    OBJECTIVE:   Short Term Goals - Time Frame for Short Term Goals: 2-3 weeks    Goals Current/Discharge status  Status   Short Term Goal 1: The pt will demonstrate improved postural awareness requiring <25% VC's with exercises  STG 1 Current Status[de-identified] 3/15/23 pt requires <25% VC's for posture   Met   Short Term Goal 2: The pt will have decreased L UE pain </= 3/10 at worst in order to increase ease with ADL's  STG 2 Current Status[de-identified] 3/15/23 pt denies any recent pain   Met     Long Term Goals - Time Frame for Long Term Goals : 4-6 weeks  Goals Current/ Discharge status Status   Long Term Goal 1: The pt will be indep/compliant with HEP in order to self-manage symptoms upon D/C LTG 1 Current Status[de-identified] 3/15/23 Pt reports indep/compliance w/ HEP   Met   Long Term Goal 2: The pt will have an increase in UEFI score >/=70/80 and decrease in NDI score to 0/50 points in order to increase functional activity tolerance LTG 2 Current Status[de-identified] 3/15/23 UEFI Score 74/80,  NDI score 0/50   Met   Long Term Goal 3: The pt will demonstrate improved L UE strength >/=4+/5 in order to lift/carry up to 50# for work related duties at [address] [place] [address] LTG 3 Current Status[de-identified] 3/15/23 flex 5/5, abd 4/5, IR 5/5, ER 4+/5, MT and rhomboid 4/5, LT 3+/5; pt able to lift 50# box from floor to waist level without increased pain in L UE. Partially met   Long Term Goal 4: The pt will demo improved L shoulder and cervical AROM WNL's and B thoracic rotation >/=75% in order to increase ease with UE ADL's LTG 4 Current Status[de-identified] 3/13/23 cervical flex, ext, B SB, B rot WFL w/o pain; L shoulder flex 150, abd 110 (pain at end range), ER T2, IR L2 standing; B thoracic rotation 75-80%; Pt denies difficulty w/ ADLs   Partially met     Body Structures, Functions, Activity Limitations Requiring Skilled Therapeutic Intervention: Decreased posture, Increased pain, Decreased ADL status, Decreased ROM, Decreased strength, Decreased tolerance to work activity  Assessment: Pt demo's good progress towards goals since beginning PT demo'ing an increase in strength and AROM of L UE. Pt conts to have deficits in L shoulder abd AROM w/ minimal change over the last few weeks. However, Pt is indep and compliant w/ current HEP and plans to continue post-DC to self-manage sx's. D/C further PT at this time. Therapy Prognosis: Good    PLAN:   Frequency/Duration:  Additional Comments: D/C                  Patient Status:[] Continue/ Initiate plan of Care     [x] Discharge PT. Recommend pt continue with HEP.       [] Additional visits requested, Please re-certify for additional visits:     [] Hold        Signature: Electronically signed by Karen Hodgson PT on 3/16/2023 at 9:52 AM  Objective information by: Electronically signed by Julieann Scheuermann, PTA on 3/15/23 at 1:54 PM EDT    If you have any questions or concerns, please don't hesitate to call.  Thank you for your referral.

## 2023-03-15 NOTE — PROGRESS NOTES
[address] [facility]  Outpatient Physical Therapy    Treatment Note        Date: 3/15/2023  Patient: Michelle Alegre  : 1970   Confirmed: Yes  MRN: [id number]  Referring Provider: Annette Romeo MD    Medical Diagnosis: Strain of unspecified muscle, fascia and tendon at shoulder and upper arm level, left arm, initial encounter [S46.912A]  Strain of muscle, fascia and tendon at neck level, initial encounter [S16. 1XXA]       Treatment Diagnosis: L shoulder and neck pain,decreased L shoulder AROM, decreased cervical AROM, decreased thoracic rotation AROM, decreased L UE strength, decreased posture, decreased activity tolerance, and signifciant soft tissue reestrictions throughout cervical and shoulder complex    Visit Information:  Insurance: Payor: [employer] / Plan: [employer] / Product Type: *No Product type* /   PT Visit Information  Onset Date: 10/19/22  PT Insurance Information: [id number] claim # [id number]  Total # of Visits Approved: 8  Total # of Visits to Date:   Plan of Care/Certification Expiration Date: 23  No Show: 1  Progress Note Due Date: 23  Canceled Appointment: 7  Progress Note Counter:     Subjective Information:  Subjective: Pt denies L shoulder pain. Pt states she has not heard if she was approved to see ortho. HEP Compliance:  [x] Good [] Fair [] Poor [] Reports not doing due to:    Pain Screening  Patient Currently in Pain: Denies    Treatment:  Exercises:  Exercises  Exercise 1: UBE 4.5, F/R, 5 min total  Exercise 2: apollo rows/lats 30# 2x10 ea  Exercise 3: apollo IR/ER 20# 2x10 ea  Exercise 5: L UE prone rows 3# x15, lats 3# x15, horizontal abd IR/ER 2# x15ea, LT x15  Exercise 6: ball stabs at wall 4-way x10 ea 1kg ball  Exercise 7: wall slides flex, abd 5''x10  Exercise 8: number wall reach 0-9 w/ 1kg ball x4  Exercise 20: reviewed goals, progress towards goals, discussed HEP  Treatment Reasoning  Limitations addressed:  Mobility, Flexibility    *Indicates exercise, modality, or manual techniques to be initiated when appropriate    Objective Measures: x10 mins    STG 1 Current Status[de-identified] 3/15/23 pt requires <25% VC's for posture  STG 2 Current Status[de-identified] 3/15/23 pt denies any recent pain     LTG 1 Current Status[de-identified] 3/15/23 Pt reports indep/compliance w/ HEP  LTG 2 Current Status[de-identified] 3/15/23 UEFI Score 74/80,  NDI score 0/50  LTG 3 Current Status[de-identified] 3/15/23 flex 5/5, abd 4/5, IR 5/5, ER 4+/5, MT and rhomboid 4/5, LT 3+/5; pt able to lift 50# box from floor to waist level without increased pain in L UE. LTG 4 Current Status[de-identified] 3/13/23 cervical flex, ext, B SB, B rot WFL w/o pain; L shoulder flex 150, abd 110 (pain at end range), ER T2, IR L2 standing; B thoracic rotation 75-80%; Pt denies difficulty w/ ADLs      Assessment: Body Structures, Functions, Activity Limitations Requiring Skilled Therapeutic Intervention: Decreased posture, Increased pain, Decreased ADL status, Decreased ROM, Decreased strength, Decreased tolerance to work activity  Assessment: Pt demo's good progress towards goals since beginning PT demo'ing an increase in strength and AROM of L UE; however limitation remains w/ L shoulder abd AROM w/ minimal change over the last few weeks. L IR AROM gradually improving  w/ pt performing IR stretches. Pt able to lift 50# box from floor to waist level without increased pain in L UE. Pt is indep and compliant w/ current HEP and plans to continue post-DC to self-manage sx's.   Treatment Diagnosis: L shoulder and neck pain,decreased L shoulder AROM, decreased cervical AROM, decreased thoracic rotation AROM, decreased L UE strength, decreased posture, decreased activity tolerance, and signifciant soft tissue reestrictions throughout cervical and shoulder complex  Therapy Prognosis: Good       Patient Education: [] NA   Goals, progress towards goals, HEP    Post-Pain Assessment:       Pain Rating (0-10 pain scale):   0/10   Location and pain description same as pre-treatment unless indicated. Action: [] NA   [x] Perform HEP  [] Meds as prescribed  [] Modalities as prescribed   [] Call Physician     GOALS   Patient Goal(s): Patient Goals : \"Use my shoulder\"    Short Term Goals Completed by 2-3 weeks Goal Status   STG 1 The pt will demonstrate improved postural awareness requiring <25% VC's with exercises Met   STG 2 The pt will have decreased L UE pain </= 3/10 at worst in order to increase ease with ADL's Met     Long Term Goals Completed by 4-6 weeks Goal Status   LTG 1 The pt will be indep/compliant with HEP in order to self-manage symptoms upon D/C Met   LTG 2 The pt will have an increase in UEFI score >/=70/80 and decrease in NDI score to 0/50 points in order to increase functional activity tolerance Met   LTG 3 The pt will demonstrate improved L UE strength >/=4+/5 in order to lift/carry up to 50# for work related duties at [employer] Partially met   LTG 4 The pt will demo improved L shoulder and cervical AROM WNL's and B thoracic rotation >/=75% in order to increase ease with UE ADL's Partially met          Plan:  Frequency/Duration:  Plan  Additional Comments: D/C  Pt to continue current HEP. See objective section for any therapeutic exercise changes, additions or modifications this date.     Therapy Time:      PT Individual Minutes  Time In: 1301  Time Out: 1342  Minutes: 41  Timed Code Treatment Minutes: 41 Minutes  Procedure Minutes:0    Modality Time In Time Out Total Minutes Units    Ther ex ([id number]) [id number] 31 2   Manual Therapy ([id number]) obj measures 1301 1311 10 1     Electronically signed by Brady Rodriguez PTA on 3/15/23 at 1:20 PM EDT

## 2023-03-16 ENCOUNTER — APPOINTMENT (OUTPATIENT)
Dept: PHYSICAL THERAPY | Age: 53
End: 2023-03-16
Payer: COMMERCIAL

## 2023-03-23 ENCOUNTER — OFFICE VISIT (OUTPATIENT)
Dept: ORTHOPEDIC SURGERY | Age: 53
End: 2023-03-23

## 2023-03-23 VITALS
TEMPERATURE: 97.6 F | HEART RATE: 80 BPM | HEIGHT: 72 IN | BODY MASS INDEX: 29.53 KG/M2 | WEIGHT: 218 LBS | OXYGEN SATURATION: 97 %

## 2023-03-23 DIAGNOSIS — M75.02 ADHESIVE CAPSULITIS OF LEFT SHOULDER: Primary | ICD-10-CM

## 2023-03-23 RX ORDER — CYCLOBENZAPRINE HCL 10 MG
10 TABLET ORAL PRN
COMMUNITY
Start: 2023-01-12

## 2023-03-23 ASSESSMENT — ENCOUNTER SYMPTOMS
GASTROINTESTINAL NEGATIVE: 1
RESPIRATORY NEGATIVE: 1
ALLERGIC/IMMUNOLOGIC NEGATIVE: 1
EYES NEGATIVE: 1

## 2023-03-23 NOTE — PROGRESS NOTES
Exam:  Ortho Exam    Physical exam of the cervical spine:  full ROM and no palpable tenderness  Negative radiculopathy. Physical exam of the left shoulder: There is not swelling and deformity of the shoulder girdle. There is not a claribel sign. There is not muscular atrophy present. There is not scapular winging. Active and passive range of motion of the shoulder is equal.    Active range of motion of the shoulder is decreased. Forward flexion: 160. External rotation at side: 5. Internal rotation: L5. There is pain with end ranges of motion. There is not crepitus. Strength: 5/5 deltoid. 5/5 biceps. 5/5 triceps. 5/5 supraspinatus. 5/5 infraspinatus. 5/5 teres minor. Negative Hornblower's sign. 5/5 subscapularis. Negative belly press. Special/provocative tests: Positive impingement signs. Neer impingement sign negative. Lopez test positive. Negative Shireen's test. There is not pain with cross body adduction. Negative Trigg's test. Negative Speed's test.   Instability: N/a  Sensation intact to light touch along the C4-T1 distribution: normal.   Radial pulse is normal and symmetric. Exam of the contralateral right shoulder: within normal limits. Radiographs and Laboratory Studies:     Diagnostic Imaging Studies:    MRI of the left shoulder dated 11/8/2022 was reviewed by me and demonstrates rotator cuff tendinosis with a partial-thickness articular sided tear of the supraspinatus. The remainder of the rotator cuff appears intact. There is good muscle bulk and no significant fatty atrophy throughout the rotator cuff musculature. Mild intra-articular tendinosis of the long head of the biceps tendon. No significant glenohumeral arthritis. Laboratory Studies:   No results found for: WBC, HGB, HCT, MCV, PLT  No results found for: SEDRATE  No results found for: CRP    Assessment:      Diagnosis Orders   1.  Adhesive capsulitis of left shoulder  Felix Delatorre MD, Sports Medicine,

## 2023-05-18 ENCOUNTER — OFFICE VISIT (OUTPATIENT)
Dept: ORTHOPEDIC SURGERY | Age: 53
End: 2023-05-18

## 2023-05-18 VITALS
OXYGEN SATURATION: 98 % | WEIGHT: 225 LBS | HEART RATE: 80 BPM | HEIGHT: 72 IN | TEMPERATURE: 97.5 F | BODY MASS INDEX: 30.48 KG/M2

## 2023-05-18 DIAGNOSIS — M75.02 ADHESIVE CAPSULITIS OF LEFT SHOULDER: Primary | ICD-10-CM

## 2023-05-18 DIAGNOSIS — M75.112 INCOMPLETE TEAR OF LEFT ROTATOR CUFF, UNSPECIFIED WHETHER TRAUMATIC: ICD-10-CM

## 2023-05-18 RX ORDER — METHYLPREDNISOLONE ACETATE 80 MG/ML
80 INJECTION, SUSPENSION INTRA-ARTICULAR; INTRALESIONAL; INTRAMUSCULAR; SOFT TISSUE ONCE
Status: COMPLETED | OUTPATIENT
Start: 2023-05-18 | End: 2023-05-18

## 2023-05-18 RX ORDER — LIDOCAINE HYDROCHLORIDE 10 MG/ML
5 INJECTION, SOLUTION INFILTRATION; PERINEURAL ONCE
Status: COMPLETED | OUTPATIENT
Start: 2023-05-18 | End: 2023-05-18

## 2023-05-18 RX ADMIN — LIDOCAINE HYDROCHLORIDE 5 ML: 10 INJECTION, SOLUTION INFILTRATION; PERINEURAL at 15:58

## 2023-05-18 RX ADMIN — METHYLPREDNISOLONE ACETATE 80 MG: 80 INJECTION, SUSPENSION INTRA-ARTICULAR; INTRALESIONAL; INTRAMUSCULAR; SOFT TISSUE at 15:59

## 2023-05-18 ASSESSMENT — ENCOUNTER SYMPTOMS
EYES NEGATIVE: 1
ALLERGIC/IMMUNOLOGIC NEGATIVE: 1
GASTROINTESTINAL NEGATIVE: 1
RESPIRATORY NEGATIVE: 1

## 2023-05-18 NOTE — PROGRESS NOTES
Orthopedic Surgery and Sports Medicine    Subjective:      Patient ID: Yazan Vallejo is a 47 y.o. female who presents today for:  Chief Complaint   Patient presents with    Follow-up     Patient presents today for a f/u for workers comp. She states that she was unable to get the injection done with Dr. Derek Domingo due to workers comp denying her injection. Pt is not in therapy anymore due to completion. She is not taking her antiinflammatory due to not being in pain. I told her it helps with inflammation not so much of pain. She states that she will continue to take it. HPI  Carli Riggins is a 56-year-old female presents today for follow-up evaluation of her left shoulder pain. She injured her shoulder on 10/19/2023 at work. She was on a stepstool when it gave out and she was trying to turn a wrench causing her to push all of her weight onto her left shoulder. She had immediate pain in her left shoulder at that time. She had an MRI in November 2022 which showed a partial-thickness articular sided tear of the supraspinatus. However at her last clinic visit she was found to be stiff in the shoulder which was consistent with adhesive capsulitis. I referred her to Dr. Derek Domingo for an ultrasound-guided injection of the glenohumeral joint for her frozen shoulder. She did not get this injection because she states that [facility] denied it. She has completed her physical therapy. She is not currently taking her anti-inflammatory medications. She currently reports that she does not have much pain in the shoulder, she just continues to have stiffness. She is lacking mostly internal rotation and external rotation. She has trouble with clasping her bra behind the back. She denies any new onset numbness or tingling. She has been back to work.     Previous treatment: Physical therapy, anti-inflammatory medication  NSAIDs: Yes, previously but not currently  Physical therapy: Yes, duration - 3 months,

## 2023-08-03 ENCOUNTER — OFFICE VISIT (OUTPATIENT)
Dept: ORTHOPEDIC SURGERY | Age: 53
End: 2023-08-03

## 2023-08-03 DIAGNOSIS — M75.112 INCOMPLETE TEAR OF LEFT ROTATOR CUFF, UNSPECIFIED WHETHER TRAUMATIC: ICD-10-CM

## 2023-08-03 DIAGNOSIS — M75.02 ADHESIVE CAPSULITIS OF LEFT SHOULDER: Primary | ICD-10-CM

## 2023-08-03 ASSESSMENT — ENCOUNTER SYMPTOMS
ALLERGIC/IMMUNOLOGIC NEGATIVE: 1
EYES NEGATIVE: 1
GASTROINTESTINAL NEGATIVE: 1
RESPIRATORY NEGATIVE: 1

## 2023-08-03 NOTE — PROGRESS NOTES
Orthopedic Surgery and Sports Medicine    Subjective:      Patient ID: Avinash Winchester is a 47 y.o. female who presents today for:  Chief Complaint   Patient presents with    Follow-up     Follow up visit for Adhesive capsulitis of left shoulder, says shoulder has improved, since injection       HPI  Stacy Patton is a 31-year-old female presents today for follow-up evaluation of her left shoulder pain. She injured her shoulder on 10/19/2023 at work. She was on a stepstool when it gave out and she was trying to turn a wrench causing her to push all of her weight onto her left shoulder. She had immediate pain in her left shoulder at that time. She had an MRI in November 2022 which showed a partial-thickness articular sided tear of the supraspinatus. However at her last clinic visit she was found to be stiff in the shoulder which was consistent with adhesive capsulitis. I referred her to Dr. Ely Ibarra for an ultrasound-guided injection of the glenohumeral joint for her frozen shoulder. She did not get this injection because she states that [facility] denied it. Therefore I gave her a left shoulder injection at her last clinic visit on 5/18/2023 and recommended that she continue with physical therapy. She reports improvement with the injection. She has been doing her exercises at home. Overall she feels like she is made continued improvements. The only thing that she still somewhat struggles with is trying to get to the clasp on a back clasped bra. She has minimal pain. She denies any new onset numbness or tingling. 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        Previous treatment: Physical therapy, anti-inflammatory medication, left shoulder cortisone injection on 5/18/2023  NSAIDs: Yes, previously but not currently  Physical therapy: Yes, duration - 3 months     Hand Dominance: right  Occupation: Works from medical office